# Patient Record
Sex: FEMALE | Race: WHITE | NOT HISPANIC OR LATINO | Employment: OTHER | URBAN - METROPOLITAN AREA
[De-identification: names, ages, dates, MRNs, and addresses within clinical notes are randomized per-mention and may not be internally consistent; named-entity substitution may affect disease eponyms.]

---

## 2023-11-09 ENCOUNTER — TELEPHONE (OUTPATIENT)
Dept: ORTHOPEDICS | Facility: CLINIC | Age: 78
End: 2023-11-09

## 2023-11-09 NOTE — TELEPHONE ENCOUNTER
Writer had called pt to get scheduled with soonest available appointment with any one of the spine providers. Referral was for Dr. Hagen, but his schedule is out to January. Pt should be seen sooner. Pt can schedule with Dr. Steen, Dr. Courtney, and or Dr. Martinez to be seen sooner.     Alta Palencia LPN

## 2023-11-15 ENCOUNTER — TELEPHONE (OUTPATIENT)
Dept: ORTHOPEDICS | Facility: CLINIC | Age: 78
End: 2023-11-15
Payer: COMMERCIAL

## 2023-11-15 DIAGNOSIS — S32.10XA: Primary | ICD-10-CM

## 2023-11-15 DIAGNOSIS — S32.2XXA: Primary | ICD-10-CM

## 2023-11-15 NOTE — TELEPHONE ENCOUNTER
Writer called and left a voice message for pt stating that Dr. Hagen would like pt to be seen sooner than January. Dr. Hagen does not have sooner available but Dr. Courtney and Dr. Martinez have opening 11/21 and 11/22 to be seen next week. Pt is to call clinic to discuss sooner.     Atla Palencia LPN

## 2023-11-15 NOTE — TELEPHONE ENCOUNTER
Action November 15, 2023 10:14 AM MT   Action Taken Sent a request for imaging from [facility].        DIAGNOSIS: Sacral Insufficiency Fx and L5 Pedicle Fx   APPOINTMENT DATE: 11/16/2023   NOTES STATUS DETAILS   OFFICE NOTE from referring provider Care Everywhere 11/06/2023 - Noe Aleman MD    OFFICE NOTE from other specialist Care Everywhere    PHYSICAL THERAPY Care Everywhere    MEDICATION LIST Care Everywhere    LABS     DEXA PACS  [facility]:  07/05/2022, 07/12/2019 - Hip/Spine   INJECTIONS DONE IN RADIOLOGY N/A  [facility]:  08/31/2023 - Injection   MRI PACS  [facility]:  10/23/2023 - L Spine   CT SCAN PACS  [facility]:  11/01/2023 - L Spine   XRAYS (IMAGES & REPORTS) PACS  [facility]:  11/06/2023- Pelvis    [facility]:  04/26/2023 - LT Hip/Pelvis

## 2023-11-16 ENCOUNTER — OFFICE VISIT (OUTPATIENT)
Dept: ORTHOPEDICS | Facility: CLINIC | Age: 78
End: 2023-11-16
Payer: COMMERCIAL

## 2023-11-16 ENCOUNTER — ANCILLARY PROCEDURE (OUTPATIENT)
Dept: GENERAL RADIOLOGY | Facility: CLINIC | Age: 78
End: 2023-11-16
Attending: ORTHOPAEDIC SURGERY
Payer: COMMERCIAL

## 2023-11-16 ENCOUNTER — PRE VISIT (OUTPATIENT)
Dept: ORTHOPEDICS | Facility: CLINIC | Age: 78
End: 2023-11-16

## 2023-11-16 ENCOUNTER — TELEPHONE (OUTPATIENT)
Dept: ORTHOPEDICS | Facility: CLINIC | Age: 78
End: 2023-11-16

## 2023-11-16 VITALS — WEIGHT: 136 LBS | BODY MASS INDEX: 25.68 KG/M2 | HEIGHT: 61 IN

## 2023-11-16 DIAGNOSIS — S32.2XXA: ICD-10-CM

## 2023-11-16 DIAGNOSIS — S32.10XA: ICD-10-CM

## 2023-11-16 DIAGNOSIS — M84.48XK: Primary | ICD-10-CM

## 2023-11-16 DIAGNOSIS — M80.00XG AGE-RELATED OSTEOPOROSIS WITH CURRENT PATHOLOGICAL FRACTURE WITH DELAYED HEALING, SUBSEQUENT ENCOUNTER: ICD-10-CM

## 2023-11-16 DIAGNOSIS — M41.55 OTHER SECONDARY SCOLIOSIS, THORACOLUMBAR REGION: ICD-10-CM

## 2023-11-16 PROCEDURE — 99205 OFFICE O/P NEW HI 60 MIN: CPT | Performed by: ORTHOPAEDIC SURGERY

## 2023-11-16 PROCEDURE — 77073 BONE LENGTH STUDIES: CPT | Performed by: STUDENT IN AN ORGANIZED HEALTH CARE EDUCATION/TRAINING PROGRAM

## 2023-11-16 PROCEDURE — 72170 X-RAY EXAM OF PELVIS: CPT | Performed by: RADIOLOGY

## 2023-11-16 PROCEDURE — 72082 X-RAY EXAM ENTIRE SPI 2/3 VW: CPT | Performed by: STUDENT IN AN ORGANIZED HEALTH CARE EDUCATION/TRAINING PROGRAM

## 2023-11-16 RX ORDER — MELOXICAM 7.5 MG/1
7.5 TABLET ORAL EVERY EVENING
COMMUNITY
Start: 2023-11-14 | End: 2023-12-06

## 2023-11-16 RX ORDER — ASCORBIC ACID 500 MG
500 TABLET ORAL EVERY MORNING
COMMUNITY

## 2023-11-16 RX ORDER — BUPROPION HYDROCHLORIDE 150 MG/1
150 TABLET ORAL EVERY MORNING
COMMUNITY
Start: 2023-08-09 | End: 2024-08-08

## 2023-11-16 RX ORDER — AMOXICILLIN 500 MG/1
CAPSULE ORAL
COMMUNITY
Start: 2023-01-24 | End: 2023-12-06

## 2023-11-16 RX ORDER — TRAMADOL HYDROCHLORIDE 50 MG/1
50 TABLET ORAL EVERY 8 HOURS PRN
COMMUNITY
Start: 2023-11-14

## 2023-11-16 RX ORDER — SENNOSIDES 8.6 MG
1300 CAPSULE ORAL EVERY 8 HOURS PRN
Status: ON HOLD | COMMUNITY
End: 2024-01-03

## 2023-11-16 RX ORDER — BUTYROSPERMUM PARKII(SHEA BUTTER), SIMMONDSIA CHINENSIS (JOJOBA) SEED OIL, ALOE BARBADENSIS LEAF EXTRACT .01; 1; 3.5 G/100G; G/100G; G/100G
1 LIQUID TOPICAL EVERY MORNING
COMMUNITY

## 2023-11-16 RX ORDER — THIAMINE HCL 100 MG
1 TABLET ORAL EVERY MORNING
COMMUNITY

## 2023-11-16 RX ORDER — LORAZEPAM 0.5 MG/1
0.5 TABLET ORAL PRN
COMMUNITY
Start: 2023-07-12 | End: 2023-11-22

## 2023-11-16 RX ORDER — PYRIDOXINE HCL (VITAMIN B6) 100 MG
500 TABLET ORAL EVERY MORNING
COMMUNITY

## 2023-11-16 NOTE — TELEPHONE ENCOUNTER
Patient is scheduled for surgery with Dr. Hagen    Spoke with: Patient and daughter-in-law    Date of Surgery: 12/5/23    Location: [facility]    Informed patient they will need an adult  : Yes    Post ops: 6 & 12 weeks    Pre op with Provider: Complete    H&P: Scheduled with PAC 11/22/23    Additional imaging/appointments: N/A    Surgery packet: Received in clinic     Additional comments: Please send PAC virtual link to daughter-in-law's email as she will be with and assisting the patient with setting up the call        Johanny Ernst MA on 11/16/2023 at 11:13 AM

## 2023-11-16 NOTE — LETTER
11/16/2023         RE: Garth Montgomery  [address]  [place] [address]        Dear Colleague,    Thank you for referring your patient, Garth Montgomery, to the [facility] [place]. Please see a copy of my visit note below.    REASON FOR CONSULTATION: Consult (Sacral Insuff FX & L5 Pedicle FX.  Other form of scoliosis of lumbar spine/[email or web address]/)       REFERRING PHYSICIAN: Noe Aleman     PRIMARY CARE PHYSICIAN: Lizzie Ruelas    HISTORY OF PRESENT ILLNESS: Garth Montgomery is a pleasant 78 year old female who is referred today for evaluation of her pelvis.  She was seen by Dr. Aleman who performed a CT scan and MRI identifying a sacral insufficiency fracture and right-sided pedicle fracture at L5.  Patient states that she had a ground-level fall in her bathtub back in August.  She states that all of her left-sided pain began at that time and she has had increasing pain since that time.  She has tried conservative measures since then such as Mobic tramadol and Tylenol which have provided intermittent relief but unfortunately she states her pain is continued to worsen.  She now uses a cane to ambulate because of the pain.  States it is uncomfortable to sit and find herself constantly repositioning due to the pain.  She states that she has no thoracic or lumbar back pain typically and was doing quite well before this incident.  She denies any numbness tingling or weakness down her bilateral lower extremities.    Patient overall is healthy denying any chronic medical conditions does not have diabetes is a non-smoker and has no reported history of cardiovascular incidents.    Oswestry score:   Oswestry (RENZO) Questionnaire        11/16/2023     9:12 AM   OSWESTRY DISABILITY INDEX   Count 9   Sum 27   Oswestry Score (%) 60 %       Visual Analog Scale (VAS) Questionnaire        11/16/2023     9:10 AM   VISUAL ANALOG PAIN SCALE   Back Pain Scale 0-10 8   Right leg pain 0  "  Left leg pain 8   Neck Pain Scale 0-10 0   Right arm pain 0   Left arm pain 0            REVIEW OF SYSTEMS:   See HPI for pertinent positives. Otherwise complete ROS negative.     No Known Allergies    No past medical history on file.    No past surgical history on file.    No family history on file.    Social History     Tobacco Use    Smoking status: Not on file    Smokeless tobacco: Not on file   Substance Use Topics    Alcohol use: Not on file       Current Outpatient Medications   Medication    acetaminophen (TYLENOL) 650 MG CR tablet    amoxicillin (AMOXIL) 500 MG capsule    buPROPion (WELLBUTRIN XL) 150 MG 24 hr tablet    calcium citrate-vitamin D (CALCIUM CITRATE-VITAMIN D3) 315-6.25 MG-MCG TABS per tablet    Cranberry (RA CRANBERRY) 500 MG CAPS    LORazepam (ATIVAN) 0.5 MG tablet    LUTEIN PO    Magnesium Citrate 100 MG CAPS    meloxicam (MOBIC) 7.5 MG tablet    potassium 99 MG TABS    traMADol (ULTRAM) 50 MG tablet    vitamin C (ASCORBIC ACID) 500 MG tablet     No current facility-administered medications for this visit.       PHYSICAL EXAM:  Ht 1.54 m (5' 0.63\")   Wt 61.7 kg (136 lb)   BMI 26.01 kg/m    Constitutional - Patient is healthy, well-nourished and appears stated age  Respiratory - Patient is breathing normally and in no respiratory distress.  Skin - No suspicious rashes or lesions.  Gait- raises from chair without assistance.  Uses a cane with ambulation, overall forward sagittal balance with standing   Neurologic - Sensation intact to light touch bilaterally.  patella +1, ankle + 1. ankle clonus 0 beats, plantar reflex downgoing.    Spine:   Thoracic Spine: normal kyphosis  Palpation - Non-tender to palpation  Lumbar Spine:  Appearance - Normal  Palpation - Non-tender to palpation apart from the approximate level of the right TP at L5.  Facets non-tender to palpation, facet loading negative  Straight leg raise negative  Patient is exquisitely tender to palpation over the left aspect of the " sacrum posteriorly.    Musculoskeletal:  Motor -    Motor -        LOWER EXTREMITY Left Right   Hip flexion 5/5 5/5   Knee flexion 5/5 5/5   Knee extension 5/5 5/5   Ankle dorsiflexion 5/5 5/5   Ankle plantarflexion 5/5 5/5   Great toe extension 5/5 5/5       IMAGING: all imaging is personally reviewed and interpreted during the visit.   Scoliosis EOS Spine XR, dated 11/16 demonstrating lumbar scoliosis as well as double major curve in the thoracic region.  Positive SVA with increased thoracic kyphosis.          Mcelroy view of the pelvis obtained today lucency left sacral ala likely consistent with fracture line visualized better on CT scan dated 11/1/2023.      CT scan dated 11/1/2023 reviewed demonstrating nondisplaced defect of the right pedicle at L5 visualized on the sagittal imaging below, additionally there is a left-sided zone 1 sacral insufficiency fracture on the axial imaging below.        MRI Lumbar Spine, dated 10/23/23      CLINICAL ASSESSMENT:   78 year old female with left-sided sacral insufficiency fracture and right-sided pedicle fracture at L5, nondisplaced.  Her pain is primarily posteriorly over the left side of the sacrum at this time.    DISCUSSION/PLAN:     Schedule for surgical fixation of left sacral insufficiency fracture  Will manage the right L5 pedicle fracture conservatively at this time  Patient is to touch base with her PCP about bone health and resuming treatment for her likely osteoporosis.     Patient has a left-sided sacral insufficiency fracture that is remaining significantly painful for her.  Again she additionally has a right L5 pedicle fracture.  Nondisplaced.  Patient has worsening pain and after discussion today and review of the imaging has agreed to undergo surgical fixation for the left-sided sacral insufficiency fracture.  She opted to undergo surgical fixation.      We reviewed the risks and benefits of the surgery in detail. The risks include those associated with  anesthesia, including death, pulmonary embolism, DVT, stroke, myocardial infarction, pneumonia, blindness, and urinary tract infection. Additional risks include the risk of blood loss, infection, nerve damage, failure to heal, hardware problems and failure of the intervention to improve their symptoms.  With regard to blood loss, we use a medication called tranexamic acid.  To mitigate the risk of infection, we use antibiotics, both IV and in the wound.  The risk of nerve damage includes temporary or permanent damage resulting in weakness, pain, numbness, and even paralysis. With regard to nerve damage, we use intra-operative neuro-monitoring to help identify and address potential problems.  To assist in healing the fusion, we use bone graft .  To avoid hardware problems, we use an intra-operative CT, which is like GPS for the spine.  They understands the risks of the surgery and wishes to proceed.     All questions and concerns were answered to the patient's apparent satisfaction before leaving the clinic. We used the patient's imaging, diagrams, models to explain the pathophysiology of their disease as well as surgical and non-surgical treatment options.     Thank you for allowing us to be a part of this patient's care.   The above plan was formulated by Dr. Hagen who also saw and examined the patient.     Respectfully,  Nathaniel Shannon, DO  Spine Fellow       Greater than 45 minutes was dedicated to review of related documentation, prior imaging studies and patient exam and encounter     I saw and evaluated the patient today.  I independently reviewed and interpreted her outside imaging studies as well as the eos and Mcelroy views of the pelvis today.  I confirmed the diagnosis of the sacral insufficiency fracture in the contralateral pedicle fracture.  Her scoliosis does not appear as severe as previously seen.  After extended discussion with the patient and her  the plan is to treat her sacral  insufficiency fracture.  We discussed the treatment options risk benefits alternative treatments and expected outcomes.  I explained that I am a consultant for the company that makes the screws that we will use to fix the sacral insufficiency fracture.  I explained that this is typically outpatient surgery.  I explained the critical nature of treating her osteoporosis.  We specifically discussed anabolic bone agents.  My recommendation to her would be the use of Evenity.  This has the most significant effect and the most rapid affect.  If at all possible we would like for her to get 1 dose of this prior to getting her sacral fracture fixed.  If we cannot arrange that then we will go forward with the sacral fracture fixation and then try to get that set up afterwards.  Subsequent to the visit I was able to speak to one of my colleagues Dr. Brittney Mccollum who does bone health for us.  I went over the patient's case scenario and showed her the sacral insufficiency fracture.  I also showed her the opportunistic bone mineral density being in the range of 50 to 60 Hounsfield units at L1 with normal being 135.  Dr. Mccollum indicated that typically a DEXA scan is still required even with an opportunistic bone mineral density.  She will try to set this up to get it done closer to the patient's home.  She will then try to get her started on the Evenity.    My total contact time for this patient encounter including image ordering, image interpretation, face-to-face time, documentation, case  request, professional colleague consultation was greater than 60 minutes.  Jones Hagne MD          Teaching Flowsheet   Relevant Diagnosis: spine  Teaching Topic: preop     Person(s) involved in teaching:   Patient and Dtr in law     Motivation Level:  Asks Questions: Yes  Eager to Learn: Yes  Cooperative: Yes  Receptive (willing/able to accept information): Yes  Any cultural factors/Latter-day beliefs that may influence  understanding or compliance? No       Patient and Family demonstrates understanding of the following:  Reason for the appointment, diagnosis and treatment plan: Yes  Knowledge of proper use of medications and conditions for which they are ordered (with special attention to potential side effects or drug interactions): Yes  Which situations necessitate calling provider and whom to contact: Yes       Teaching Concerns Addressed:        Proper use and care of meds (medical equip, care aids, etc.): Yes  Nutritional needs and diet plan: Yes  Pain management techniques: Yes  Wound Care: Yes  How and/when to access community resources: Yes     Instructional Materials Used/Given: preop pkt     Time spent with patient: 15 minutes.      Jones Hagen MD

## 2023-11-16 NOTE — NURSING NOTE
"Reason For Visit:   Chief Complaint   Patient presents with    Consult     Sacral Insuff FX & L5 Pedicle FX.  Other form of scoliosis of lumbar spine/[email or web address]       Primary MD: Lizzie Ruelas  Ref. MD: Dr. Aleman    ?  No  Occupation retired.    Date of injury: 8/2023  Type of injury: Fell in bath tub, spring fell in driveway    Date of surgery: No  Type of surgery: No.    Smoker: No  Request smoking cessation information: No    Ht 1.54 m (5' 0.63\")   Wt 61.7 kg (136 lb)   BMI 26.01 kg/m      Pain Assessment  Patient Currently in Pain: Yes  0-10 Pain Scale: 8  Primary Pain Location: Back    Oswestry (RENZO) Questionnaire        11/16/2023     9:12 AM   OSWESTRY DISABILITY INDEX   Count 9   Sum 27   Oswestry Score (%) 60 %        Visual Analog Pain Scale  Back Pain Scale 0-10: 8  Right leg pain: 0  Left leg pain: 8  Neck Pain Scale 0-10: 0  Right arm pain: 0  Left arm pain: 0    Promis 10 Assessment        11/16/2023     9:14 AM   PROMIS 10   In general, would you say your health is: Very good   In general, would you say your quality of life is: Good   In general, how would you rate your physical health? Good   In general, how would you rate your mental health, including your mood and your ability to think? Good   In general, how would you rate your satisfaction with your social activities and relationships? Very good   In general, please rate how well you carry out your usual social activities and roles Very good   To what extent are you able to carry out your everyday physical activities such as walking, climbing stairs, carrying groceries, or moving a chair? Mostly   In the past 7 days, how often have you been bothered by emotional problems such as feeling anxious, depressed, or irritable? Rarely   In the past 7 days, how would you rate your fatigue on average? Moderate   In the past 7 days, how would you rate your pain on average, where 0 means no pain, and 10 means worst " imaginable pain? 8   In general, would you say your health is: 4   In general, would you say your quality of life is: 3   In general, how would you rate your physical health? 3   In general, how would you rate your mental health, including your mood and your ability to think? 3   In general, how would you rate your satisfaction with your social activities and relationships? 4   In general, please rate how well you carry out your usual social activities and roles. (This includes activities at home, at work and in your community, and responsibilities as a parent, child, spouse, employee, friend, etc.) 4   To what extent are you able to carry out your everyday physical activities such as walking, climbing stairs, carrying groceries, or moving a chair? 4   In the past 7 days, how often have you been bothered by emotional problems such as feeling anxious, depressed, or irritable? 2   In the past 7 days, how would you rate your fatigue on average? 3   In the past 7 days, how would you rate your pain on average, where 0 means no pain, and 10 means worst imaginable pain? 8   Global Mental Health Score 14   Global Physical Health Score 12   PROMIS TOTAL - SUBSCORES 26                Alta Palencia LPN

## 2023-11-16 NOTE — PROGRESS NOTES
REASON FOR CONSULTATION: Consult (Sacral Insuff FX & L5 Pedicle FX.  Other form of scoliosis of lumbar spine/[email or web address]       REFERRING PHYSICIAN: Noe Aleman     PRIMARY CARE PHYSICIAN: Lizzie Ruelas    HISTORY OF PRESENT ILLNESS: Garth Montgomery is a pleasant 78 year old female who is referred today for evaluation of her pelvis.  She was seen by Dr. Aleman who performed a CT scan and MRI identifying a sacral insufficiency fracture and right-sided pedicle fracture at L5.  Patient states that she had a ground-level fall in her bathtub back in August.  She states that all of her left-sided pain began at that time and she has had increasing pain since that time.  She has tried conservative measures since then such as Mobic tramadol and Tylenol which have provided intermittent relief but unfortunately she states her pain is continued to worsen.  She now uses a cane to ambulate because of the pain.  States it is uncomfortable to sit and find herself constantly repositioning due to the pain.  She states that she has no thoracic or lumbar back pain typically and was doing quite well before this incident.  She denies any numbness tingling or weakness down her bilateral lower extremities.    Patient overall is healthy denying any chronic medical conditions does not have diabetes is a non-smoker and has no reported history of cardiovascular incidents.    Oswestry score:   Oswestry (RENZO) Questionnaire        11/16/2023     9:12 AM   OSWESTRY DISABILITY INDEX   Count 9   Sum 27   Oswestry Score (%) 60 %       Visual Analog Scale (VAS) Questionnaire        11/16/2023     9:10 AM   VISUAL ANALOG PAIN SCALE   Back Pain Scale 0-10 8   Right leg pain 0   Left leg pain 8   Neck Pain Scale 0-10 0   Right arm pain 0   Left arm pain 0            REVIEW OF SYSTEMS:   See HPI for pertinent positives. Otherwise complete ROS negative.     No Known Allergies    No past medical history on file.    No past  "surgical history on file.    No family history on file.    Social History     Tobacco Use    Smoking status: Not on file    Smokeless tobacco: Not on file   Substance Use Topics    Alcohol use: Not on file       Current Outpatient Medications   Medication    acetaminophen (TYLENOL) 650 MG CR tablet    amoxicillin (AMOXIL) 500 MG capsule    buPROPion (WELLBUTRIN XL) 150 MG 24 hr tablet    calcium citrate-vitamin D (CALCIUM CITRATE-VITAMIN D3) 315-6.25 MG-MCG TABS per tablet    Cranberry (RA CRANBERRY) 500 MG CAPS    LORazepam (ATIVAN) 0.5 MG tablet    LUTEIN PO    Magnesium Citrate 100 MG CAPS    meloxicam (MOBIC) 7.5 MG tablet    potassium 99 MG TABS    traMADol (ULTRAM) 50 MG tablet    vitamin C (ASCORBIC ACID) 500 MG tablet     No current facility-administered medications for this visit.       PHYSICAL EXAM:  Ht 1.54 m (5' 0.63\")   Wt 61.7 kg (136 lb)   BMI 26.01 kg/m    Constitutional - Patient is healthy, well-nourished and appears stated age  Respiratory - Patient is breathing normally and in no respiratory distress.  Skin - No suspicious rashes or lesions.  Gait- raises from chair without assistance.  Uses a cane with ambulation, overall forward sagittal balance with standing   Neurologic - Sensation intact to light touch bilaterally.  patella +1, ankle + 1. ankle clonus 0 beats, plantar reflex downgoing.    Spine:   Thoracic Spine: normal kyphosis  Palpation - Non-tender to palpation  Lumbar Spine:  Appearance - Normal  Palpation - Non-tender to palpation apart from the approximate level of the right TP at L5.  Facets non-tender to palpation, facet loading negative  Straight leg raise negative  Patient is exquisitely tender to palpation over the left aspect of the sacrum posteriorly.    Musculoskeletal:  Motor -    Motor -        LOWER EXTREMITY Left Right   Hip flexion 5/5 5/5   Knee flexion 5/5 5/5   Knee extension 5/5 5/5   Ankle dorsiflexion 5/5 5/5   Ankle plantarflexion 5/5 5/5   Great toe extension " 5/5 5/5       IMAGING: all imaging is personally reviewed and interpreted during the visit.   Scoliosis EOS Spine XR, dated 11/16 demonstrating lumbar scoliosis as well as double major curve in the thoracic region.  Positive SVA with increased thoracic kyphosis.          Mcelroy view of the pelvis obtained today lucency left sacral ala likely consistent with fracture line visualized better on CT scan dated 11/1/2023.      CT scan dated 11/1/2023 reviewed demonstrating nondisplaced defect of the right pedicle at L5 visualized on the sagittal imaging below, additionally there is a left-sided zone 1 sacral insufficiency fracture on the axial imaging below.        MRI Lumbar Spine, dated 10/23/23      CLINICAL ASSESSMENT:   78 year old female with left-sided sacral insufficiency fracture and right-sided pedicle fracture at L5, nondisplaced.  Her pain is primarily posteriorly over the left side of the sacrum at this time.    DISCUSSION/PLAN:     Schedule for surgical fixation of left sacral insufficiency fracture  Will manage the right L5 pedicle fracture conservatively at this time  Patient is to touch base with her PCP about bone health and resuming treatment for her likely osteoporosis.     Patient has a left-sided sacral insufficiency fracture that is remaining significantly painful for her.  Again she additionally has a right L5 pedicle fracture.  Nondisplaced.  Patient has worsening pain and after discussion today and review of the imaging has agreed to undergo surgical fixation for the left-sided sacral insufficiency fracture.  She opted to undergo surgical fixation.      We reviewed the risks and benefits of the surgery in detail. The risks include those associated with anesthesia, including death, pulmonary embolism, DVT, stroke, myocardial infarction, pneumonia, blindness, and urinary tract infection. Additional risks include the risk of blood loss, infection, nerve damage, failure to heal, hardware problems  and failure of the intervention to improve their symptoms.  With regard to blood loss, we use a medication called tranexamic acid.  To mitigate the risk of infection, we use antibiotics, both IV and in the wound.  The risk of nerve damage includes temporary or permanent damage resulting in weakness, pain, numbness, and even paralysis. With regard to nerve damage, we use intra-operative neuro-monitoring to help identify and address potential problems.  To assist in healing the fusion, we use bone graft .  To avoid hardware problems, we use an intra-operative CT, which is like GPS for the spine.  They understands the risks of the surgery and wishes to proceed.     All questions and concerns were answered to the patient's apparent satisfaction before leaving the clinic. We used the patient's imaging, diagrams, models to explain the pathophysiology of their disease as well as surgical and non-surgical treatment options.     Thank you for allowing us to be a part of this patient's care.   The above plan was formulated by Dr. Hagen who also saw and examined the patient.     Respectfully,  Nathaniel Shannon, DO  Spine Fellow       Greater than 45 minutes was dedicated to review of related documentation, prior imaging studies and patient exam and encounter     I saw and evaluated the patient today.  I independently reviewed and interpreted her outside imaging studies as well as the eos and Mcelroy views of the pelvis today.  I confirmed the diagnosis of the sacral insufficiency fracture in the contralateral pedicle fracture.  Her scoliosis does not appear as severe as previously seen.  After extended discussion with the patient and her  the plan is to treat her sacral insufficiency fracture.  We discussed the treatment options risk benefits alternative treatments and expected outcomes.  I explained that I am a consultant for the company that makes the screws that we will use to fix the sacral insufficiency fracture.   I explained that this is typically outpatient surgery.  I explained the critical nature of treating her osteoporosis.  We specifically discussed anabolic bone agents.  My recommendation to her would be the use of Evenity.  This has the most significant effect and the most rapid affect.  If at all possible we would like for her to get 1 dose of this prior to getting her sacral fracture fixed.  If we cannot arrange that then we will go forward with the sacral fracture fixation and then try to get that set up afterwards.  Subsequent to the visit I was able to speak to one of my colleagues Dr. Brittney Mccollum who does bone health for us.  I went over the patient's case scenario and showed her the sacral insufficiency fracture.  I also showed her the opportunistic bone mineral density being in the range of 50 to 60 Hounsfield units at L1 with normal being 135.  Dr. Mccollum indicated that typically a DEXA scan is still required even with an opportunistic bone mineral density.  She will try to set this up to get it done closer to the patient's home.  She will then try to get her started on the Evenity.    My total contact time for this patient encounter including image ordering, image interpretation, face-to-face time, documentation, case  request, professional colleague consultation was greater than 60 minutes.  Jonse Hagen MD

## 2023-11-17 NOTE — TELEPHONE ENCOUNTER
FUTURE VISIT INFORMATION      SURGERY INFORMATION:  Date: 12/5/23  Location: ur or  Surgeon:  Jones Hagen MD   Anesthesia Type:  general  Procedure: Minimally invasive Left  Sacral Iliac joint fusion   Consult: ov 11/16/23    RECORDS REQUESTED FROM:       Primary Care Provider: Lizzie Ruelas APRN,CNP  - [facility]    Most recent EKG+ Tracing: 10/12/22- [facility]

## 2023-11-17 NOTE — PROGRESS NOTES
Teaching Flowsheet   Relevant Diagnosis: spine  Teaching Topic: preop     Person(s) involved in teaching:   Patient and Dtr in law     Motivation Level:  Asks Questions: Yes  Eager to Learn: Yes  Cooperative: Yes  Receptive (willing/able to accept information): Yes  Any cultural factors/Protestant beliefs that may influence understanding or compliance? No       Patient and Family demonstrates understanding of the following:  Reason for the appointment, diagnosis and treatment plan: Yes  Knowledge of proper use of medications and conditions for which they are ordered (with special attention to potential side effects or drug interactions): Yes  Which situations necessitate calling provider and whom to contact: Yes       Teaching Concerns Addressed:        Proper use and care of meds (medical equip, care aids, etc.): Yes  Nutritional needs and diet plan: Yes  Pain management techniques: Yes  Wound Care: Yes  How and/when to access community resources: Yes     Instructional Materials Used/Given: preop pkt     Time spent with patient: 15 minutes.

## 2023-11-22 ENCOUNTER — VIRTUAL VISIT (OUTPATIENT)
Dept: SURGERY | Facility: CLINIC | Age: 78
End: 2023-11-22
Payer: COMMERCIAL

## 2023-11-22 ENCOUNTER — ANESTHESIA EVENT (OUTPATIENT)
Dept: SURGERY | Facility: CLINIC | Age: 78
End: 2023-11-22
Payer: COMMERCIAL

## 2023-11-22 ENCOUNTER — PRE VISIT (OUTPATIENT)
Dept: SURGERY | Facility: CLINIC | Age: 78
End: 2023-11-22

## 2023-11-22 DIAGNOSIS — Z01.818 PRE-OP EVALUATION: Primary | ICD-10-CM

## 2023-11-22 PROCEDURE — 99202 OFFICE O/P NEW SF 15 MIN: CPT | Mod: 95 | Performed by: PHYSICIAN ASSISTANT

## 2023-11-22 RX ORDER — MULTIPLE VITAMINS W/ MINERALS TAB 9MG-400MCG
1 TAB ORAL EVERY MORNING
COMMUNITY

## 2023-11-22 RX ORDER — SODIUM PHOSPHATE,MONO-DIBASIC 19G-7G/118
1 ENEMA (ML) RECTAL EVERY MORNING
COMMUNITY

## 2023-11-22 RX ORDER — THIAMINE HCL 50 MG
100 TABLET ORAL EVERY MORNING
COMMUNITY

## 2023-11-22 ASSESSMENT — LIFESTYLE VARIABLES: TOBACCO_USE: 0

## 2023-11-22 ASSESSMENT — ENCOUNTER SYMPTOMS: SEIZURES: 0

## 2023-11-22 NOTE — PATIENT INSTRUCTIONS
Preparing for Your Surgery      Name:  Garth Montgomery   MRN:  [id number]   :  1945   Today's Date:  2023       Arriving for surgery:  Surgery date:  23  Arrival time:  6:30AM    Please come to:     Please come to:      [facility] acute [facility] [address]  [address] [place]  [address]  The Green Ramp for patients and visitors is located beneath the [facility]. The parking facility entrance is at the intersection of [address] [place] [address] and [address]. Patients and visitors who self-park will receive the reduced hospital parking rate (no ticket validation needed).  Smart Panel parking, located at the [facility] main entrance on [address] [place] [address], is available Monday - Friday from 7 am to 3:30 pm.  Discounted parking pass options can be purchased from  attendants during business hours.  -Check in at the security desk in the [facility] ([facility]) Lobby. They will direct you to the correct elevators.  -Proceed to the 3rd floor, check in at the Adult Surgery Waiting Lounge. [phone]  If you are in need of directions, a wheelchair or escort please stop at the Information Desk in the lobby.  Inform the information person that you are here for surgery; a wheelchair and escort to [address] will be provided.   An escort to the Adult Surgery Waiting Lounge will be provided.    What can I eat or drink?  -  You may eat and drink normally up to 8 hours prior to arrival time. (Until 23, 10:30PM)  -  You may have clear liquids until 2 hours prior to arrival time. (Until 23, 4:30AM)    Examples of clear liquids:  Water  Clear broth  Juices (apple, white grape, white cranberry  and cider) without pulp  Noncarbonated, powder based beverages  (lemonade and Krystian-Aid)  Sodas (Sprite, 7-Up, ginger ale and seltzer)  Coffee or tea (without milk or cream)  Gatorade    -  No Alcohol  or cannabis products for at least 24 hours before surgery.     Which medicines can I take?    Hold Meloxicam(Mobic) for 10 days prior to surgery, last dose 11/24/23.   Hold Aspirin for 7 days before surgery.   Hold Multivitamins for 7 days before surgery.  Hold Supplements, Cranberry, Glucosamine-Chondroitin, Vitamin B1 and Vitamin C for 7 days before surgery.  Hold Ibuprofen (Advil, Motrin) for 1 day(s) before surgery--unless otherwise directed by surgeon.  Hold Naproxen (Aleve) for 4 days before surgery.    -  DO NOT take these medications the day of surgery:    Calcium-Vitamin D    Vitamin D    Magnesium Citrate    Potassium        -  PLEASE TAKE these medications the day of surgery:    Acetaminophen(Tylenol) as needed    Bupropion(Wellbutrin)    Tramadol(Ultram) as needed    How do I prepare myself?  - Please take 2 showers (one the night prior to surgery and one the morning of surgery) using Scrubcare or Hibiclens soap.    Use this soap only from the neck to your toes.     Leave the soap on your skin for one minute--then rinse thoroughly.      You may use your own shampoo and conditioner. No other hair products.   - Please remove all jewelry and body piercings.  - No lotions, deodorants or fragrance.  - No makeup or fingernail polish.   - Bring your ID and insurance card.    -If you use a CPAP machine, please bring the CPAP machine, tubing, and mask to hospital.    -If you have a Deep Brain Stimulator, Spinal Cord Stimulator, or any Neuro Stimulator device---you must bring the remote control to the hospital.      ALL PATIENTS GOING HOME THE SAME DAY OF SURGERY ARE REQUIRED TO HAVE A RESPONSIBLE ADULT TO DRIVE AND BE IN ATTENDANCE WITH THEM FOR 24 HOURS FOLLOWING SURGERY.    Covid testing policy as of 12/06/2022  Your surgeon will notify and schedule you for a COVID test if one is needed before surgery--please direct any questions or COVID symptoms to your surgeon      Questions or Concerns:    - For any  questions regarding the day of surgery or your hospital stay, please contact the Pre Admission Nursing Office at [phone].       - If you have health changes between today and your surgery, please call your surgeon.       - For questions after surgery, please call your surgeons office.           Current Visitor Guidelines    You may have 2 visitors in the pre op area.    Visiting hours: 8 a.m. to 8:30 p.m.    You may have four visitors during your inpatient hospital stay.    Patients confirmed or suspected to have symptoms of COVID 19 or flu:     No visitors allowed for adult patients.   Children (under age 18) can have 1 named visitor.     People who are sick or showing symptoms of COVID 19 or flu:    Are not allowed to visit patients--we can only make exceptions in special situations.       Please follow these guidelines for your visit:          Please maintain social distance          Masking is optional--however at times you may be asked to wear a mask for the safety of yourself and others     Clean your hands with alcohol hand . Do this when you arrive at and leave the building and patient room,    And again after you touch your mask or anything in the room.     Go directly to and from the room you are visiting.     Stay in the patient s room during your visit. Limit going to other places in the hospital as much as possible     Leave bags and jackets at home or in the car.     For everyone s health, please don t come and go during your visit. That includes for smoking   during your visit.

## 2023-11-22 NOTE — PROGRESS NOTES
Garth is a 78 year old who is being evaluated via a billable video visit.      How would you like to obtain your AVS?   [email or web address] [email or web address]    Subjective   Garth is a 78 year old, presenting for the following health issues:  Pre-Op Exam (/)    GABRIEL Lyon LPN

## 2023-11-22 NOTE — H&P
Pre-Operative H & P     CC:  Preoperative exam to assess for increased cardiopulmonary risk while undergoing surgery and anesthesia.    Date of Encounter: 11/22/2023  Primary Care Physician:  Lizzie Ruelas     Reason for visit:   Encounter Diagnosis   Name Primary?    Pre-op evaluation Yes       HPI  Garth Montgomery is a 78 year old female who presents for pre-operative H & P in preparation for  Procedure Information       Case: [id number] Date/Time: 12/05/23 0830    Procedure: Minimally invasive Left  Sacral Iliac joint fusion (Left: Sacrum)    Anesthesia type: General    Diagnosis: Sacral insufficiency fracture, with nonunion, subsequent encounter [M84.48XK]    Pre-op diagnosis: Sacral insufficiency fracture, with nonunion, subsequent encounter [M84.48XK]    Location:  OR 02 / [facility] OR    Providers: Jones Hagen MD            Patient is being evaluated for comorbid conditions of dyslipidemia, depression, anxiety    Ms. Flores has a history of a sacral insufficiency fracture and right sided pedicle fracture. She was seen by Dr. Hagen for further evaluation and is now scheduled for the above procedure.     History is obtained from the patient and chart review    Hx of abnormal bleeding or anti-platelet use: denies    Menstrual history: No LMP recorded. Patient has had a hysterectomy.     Past Medical History  No past medical history on file.    Past Surgical History  Past Surgical History:   Procedure Laterality Date    HYSTERECTOMY      ORTHOPEDIC SURGERY      foot surgery    TKA         Prior to Admission Medications  Current Outpatient Medications   Medication Sig Dispense Refill    acetaminophen (TYLENOL) 650 MG CR tablet Take 1,300 mg by mouth      buPROPion (WELLBUTRIN XL) 150 MG 24 hr tablet Take 150 mg by mouth every morning      calcium citrate-vitamin D (CALCIUM CITRATE-VITAMIN D3) 315-6.25 MG-MCG TABS per tablet Take 1 tablet by mouth every morning      Cranberry (RA CRANBERRY) 500 MG CAPS Take 500  mg by mouth every morning      glucosamine-chondroitin 500-400 MG CAPS per capsule Take 1 capsule by mouth every morning      Magnesium Citrate 100 MG CAPS Take 1 capsule by mouth every morning      meloxicam (MOBIC) 7.5 MG tablet Take 7.5 mg by mouth every evening      multivitamin w/minerals (MULTI-VITAMIN) tablet Take 1 tablet by mouth every morning      potassium 99 MG TABS Take 1 tablet by mouth every morning      traMADol (ULTRAM) 50 MG tablet Take 50 mg by mouth every 6 hours as needed      vitamin B1 (THIAMINE) 50 MG tablet Take 100 mg by mouth every morning      vitamin C (ASCORBIC ACID) 500 MG tablet Take 500 mg by mouth every morning      Vitamin D, Cholecalciferol, 10 MCG (400 UNIT) CHEW Take by mouth every morning      amoxicillin (AMOXIL) 500 MG capsule Take 4 capsules by mouth one hour prior to Dental Appointment.         Allergies  No Known Allergies    Social History  Social History     Socioeconomic History    Marital status:      Spouse name: Not on file    Number of children: Not on file    Years of education: Not on file    Highest education level: Not on file   Occupational History    Not on file   Tobacco Use    Smoking status: Never     Passive exposure: Never    Smokeless tobacco: Never   Substance and Sexual Activity    Alcohol use: Not Currently    Drug use: Never    Sexual activity: Not on file   Other Topics Concern    Not on file   Social History Narrative    Not on file     Social Determinants of Health     Financial Resource Strain: Not on file   Food Insecurity: Not on file   Transportation Needs: Not on file   Physical Activity: Not on file   Stress: Not on file   Social Connections: Not on file   Interpersonal Safety: Not on file   Housing Stability: Not on file       Family History  Family History   Problem Relation Age of Onset    Anesthesia Reaction No family hx of     Deep Vein Thrombosis (DVT) No family hx of        Review of Systems  The complete review of systems is  negative other than noted in the HPI or here.   Anesthesia Evaluation   Pt has had prior anesthetic.     No history of anesthetic complications       ROS/MED HX  ENT/Pulmonary:  - neg pulmonary ROS  (-) tobacco use   Neurologic:  - neg neurologic ROS  (-) no seizures and no CVA   Cardiovascular:     (+)  - -   -  - -                                 Previous cardiac testing   Echo: Date: Results:    Stress Test:  Date: Results:    ECG Reviewed:  Date: 10/2022 Results:  NSR  Cath:  Date: Results:   (-) taking anticoagulants/antiplatelets   METS/Exercise Tolerance: >4 METS Comment: Exercise classes twice weekly- walking and leg/arm exercises. Can walk multiple blocks and ascend stairs without exertional symptoms    Hematologic:  - neg hematologic  ROS  (-) history of blood clots and history of blood transfusion   Musculoskeletal: Comment: Sacral insufficiency        GI/Hepatic:  - neg GI/hepatic ROS  (-) GERD   Renal/Genitourinary:  - neg Renal ROS     Endo:  - neg endo ROS  (-) chronic steroid usage   Psychiatric/Substance Use:     (+) psychiatric history anxiety and depression       Infectious Disease:  - neg infectious disease ROS     Malignancy:  - neg malignancy ROS     Other:            Virtual visit -  No vitals were obtained    Physical Exam  Constitutional: Awake, alert, cooperative, no apparent distress, and appears stated age.  HENT: Normocephalic  Respiratory: non labored breathing   Neurologic: Awake, alert, oriented to name, place and time.   Neuropsychiatric: Calm, cooperative. Normal affect.      Prior Labs/Diagnostic Studies   All labs and imaging personally reviewed     EKG/ stress test - if available please see in ROS above   No results found.       No data to display                  The patient's records and results personally reviewed by this provider.     Outside records reviewed from: Care Everywhere      Assessment    GarthPat Montgomery is a 78 year old female seen as a PAC referral for risk  "assessment and optimization for anesthesia.    Plan/Recommendations  Pt will be optimized for the proposed procedure.  See below for details on the assessment, risk, and preoperative recommendations    NEUROLOGY  - No history of TIA, CVA or seizure  -Post Op delirium risk factors:  No risk identified    ENT  - No current airway concerns.  Will need to be reassessed day of surgery.  Mallampati: Unable to assess  TM: Unable to assess    CARDIAC  - No history of CAD, Hypertension, and Afib  -denies cardiac history or symptoms   - METS (Metabolic Equivalents)  Patient performs 4 or more METS exercise without symptoms            Total Score: 0      RCRI-Very low risk: Class 1 0.4% complication rate            Total Score: 0        PULMONARY  MAGGI Low Risk            Total Score: 1    MAGGI: Over 50 ys old      - Denies asthma or inhaler use  - Tobacco History    History   Smoking Status    Never   Smokeless Tobacco    Never       GI  PONV High Risk  Total Score: 3           1 AN PONV: Pt is Female    1 AN PONV: Patient is not a current smoker    1 AN PONV: Intended Post Op Opioids        /RENAL  - Baseline Creatinine WNL, will update prior to surgery. Orders being faxed to [facility]     ENDOCRINE    - BMI: Estimated body mass index is 26.01 kg/m  as calculated from the following:    Height as of 11/16/23: 1.54 m (5' 0.63\").    Weight as of 11/16/23: 61.7 kg (136 lb).  Overweight (BMI 25.0-29.9)  - No history of Diabetes Mellitus    HEME  VTE Low Risk 0.26%            Total Score: 1    VTE: Greater than 59 yrs old      - No history of abnormal bleeding or antiplatelet use.  -hgb WNL, will update prior to surgery . Orders being faxed to [facility]     MSK  -sacral insufficiency fracture with the above procedure planned     Different anesthesia methods/types have been discussed with the patient, but they are aware that the final plan will be decided by the assigned anesthesia provider on the date of " service.    The patient is optimized for their procedure. AVS with information on surgery time/arrival time, meds and NPO status given by nursing staff. No further diagnostic testing indicated.    Please refer to the physical examination documented by the anesthesiologist in the anesthesia record on the day of surgery.    Video-Visit Details    Type of service:  Video Visit    Provider received verbal consent for a Video Visit from the patient? Yes     Originating Location (pt. Location): Home    Distant Location (provider location):  Off-site  Mode of Communication:  Video Conference via [facility]  On the day of service:     Prep time: 12 minutes  Visit time: 11 minutes  Documentation time: 5 minutes  ------------------------------------------  Total time: 28 minutes      Dawna Paz PA-C  Preoperative Assessment Center  [facility]  Clinic and Surgery Center  Phone: [phone]  Fax: [phone]

## 2023-11-27 ENCOUNTER — TELEPHONE (OUTPATIENT)
Dept: ORTHOPEDICS | Facility: CLINIC | Age: 78
End: 2023-11-27
Payer: COMMERCIAL

## 2023-11-27 NOTE — TELEPHONE ENCOUNTER
Health Call Center    Phone Message    May a detailed message be left on voicemail: yes     Reason for Call: Other: Patient's daughter is calling in to obtain information on patient's post op care including restrictions and when patient starts PT. She would also like information on patient's bone health visit.       Action Taken: Other: [id number]    Travel Screening: Not Applicable

## 2023-11-28 ENCOUNTER — TELEPHONE (OUTPATIENT)
Dept: ORTHOPEDICS | Facility: CLINIC | Age: 78
End: 2023-11-28
Payer: COMMERCIAL

## 2023-11-28 DIAGNOSIS — M84.48XK: Primary | ICD-10-CM

## 2023-11-28 DIAGNOSIS — M80.00XG AGE-RELATED OSTEOPOROSIS WITH CURRENT PATHOLOGICAL FRACTURE WITH DELAYED HEALING, SUBSEQUENT ENCOUNTER: ICD-10-CM

## 2023-11-28 NOTE — TELEPHONE ENCOUNTER
Hello,    I'm with ortho con.  Pt of Dr. Hagen.  Mallory with [employer] is calling from , [facility].  She works with [employer].  She is saying pt is having a preop tomorrow and asking if special labs are needed?  Also, pt had a video preop a week ago, is this appt still needed for tomorrow?  Fax# [phone].  Thank you.

## 2023-11-28 NOTE — TELEPHONE ENCOUNTER
See phone message from Primary clinic on 11-28-23 & see phone message from daughter in law yesterday 11-27-23.    I tried all day yesterday to get a hold of daughter & patient & left VM that I will keep trying to call them back.    Today 11-28-23 I called back & spoke to Mallory at [facility] Primary clinic & told her that pt does not need a preop H&P done there because pt had it done 11-22-23 by our PAC clinic. It was Video appt so PAC clinic ordered Labs K, Hgb & Cr & faxed order to [facility] so pt needs Lab appt.  Confirmed Mallory does have the Lab orders from PAC & will make Lab appt.& she will try to get a hold of pt.    I asked her to be sure the Lab faxes results to PAC clinic fax# [phone] & call them [phone] to be sure received so PAC staff can review.    I was able to get a hold of pt. 11-28-23 & review all above & have pt write down all above.  I told pt that  I will continue to try to get a hold  of daughter in law to answer all preop questions & order postop PT & review above that pt needs Labs done.  Call back prn.   S.O./Sherie Lomas RN.

## 2023-11-28 NOTE — TELEPHONE ENCOUNTER
I was able to get a hold of daughter in law this afternoon late 11-28-23 & reviewed all above & she understood.    I also told her  that Insurance denied surgery because they do not understand it is a pelvic fixation for Sacrl Insuff Fracture not an SI Joint Fusion & that  is trying to set up a peer review with insurance.  She will let pt know.   We will try to let daughter in law know if it gets approved or is denied & will have to be RSCd.    Call back prn. She agreed.    S.O./Sherie Lomas RN.

## 2023-11-28 NOTE — TELEPHONE ENCOUNTER
See phone message from Primary clinic on 11-28-23 & see phone message from daughter in law yesterday 11-27-23.    I tried all day yesterday to get a hold of daughter & patient & left VM that I will keep trying to call them back.    Today 11-28-23 I called back & spoke to Mallory at [facility] Primary clinic & told her that pt does not need a preop H&P done there because pt had it done 11-22-23 by our PAC clinic. It was Video appt so PAC clinic ordered Labs K, Hgb & Cr & faxed order to [facility] so pt needs Lab appt.  Confirmed Mallory does have the Lab orders from PAC & will make Lab appt.& she will try to get a hold of pt.    I asked her to be sure the Lab faxes results to PAC clinic fax# [phone] & call them [phone] to be sure received so PAC staff can review.    I was able to get a hold of pt. 11-28-23 & review all above & have pt write down all above.  I told pt that  I will continue to try to get a hold  of daughter in law to answer all preop questions & order postop PT & review above that pt needs Labs done.      I was able to get a hold of daughter in law this afternoon late 11-28-23 & reviewed all above & she understood.    I also told her  that Insurance denied surgery because they do not understand it is a pelvic fixation for Sacrl Insuff Fracture not an SI Joint Fusion & that  is trying to set up a peer review with insurance.  She will let pt know.   We will try to let daughter in law know if it gets approved or is denied & will have to be RSCd.    Call back prn. She agreed.    S.O./Sherie Lomas RN.

## 2023-11-29 ENCOUNTER — TELEPHONE (OUTPATIENT)
Dept: ORTHOPEDICS | Facility: CLINIC | Age: 78
End: 2023-11-29
Payer: COMMERCIAL

## 2023-11-29 NOTE — TELEPHONE ENCOUNTER
Phoned patient's daughter-in-law to discuss rescheduling Garth's surgery with Dr Hagen. Insurance requires at least 30 days to process the appeal, will need to schedule for some time in January. I left her my direct number to call back when she is able. [phone]

## 2023-11-30 NOTE — TELEPHONE ENCOUNTER
See phone message 11-30-23  from daughter in law after surgery date was canceled for Dec 5 because insurance is not approved yet.     did an appeal letter which PA dept sent to insurance 2 days ago.    PA dept stated [facility] surgery scheduler should RSC at least 30 days out to allow time for approval.    I called daughter in law back who stated she called insurance today who did not say if they had received the appeal letter but told Daughter in law they needed more info.    I told daughter in law that I will send a referral message to our PA team to have them confirm Insurance received appeal letter & try to speed up appeal since this is a Fracture & I will send message to [facility] surgery scheduler to call Daughter in law to RSC date.  Call back prn.  She agreed  Sherie Lomas RN.

## 2023-12-04 ENCOUNTER — TELEPHONE (OUTPATIENT)
Dept: ORTHOPEDICS | Facility: CLINIC | Age: 78
End: 2023-12-04
Payer: COMMERCIAL

## 2023-12-04 NOTE — TELEPHONE ENCOUNTER
Received voicemail message back from Y[facility] confirming surgery for Garth with Dr. Hagen on 1/2/24.

## 2023-12-04 NOTE — TELEPHONE ENCOUNTER
Phoned patient's daughter in law to reschedule Garth's surgery with Dr Hagen. Patient placed on the schedule for January 2, 2024 and will try to move patient up if authorization is obtained sooner. I left Melissa my direct number to call back to confirm she received the message. [phone]

## 2023-12-06 ENCOUNTER — VIRTUAL VISIT (OUTPATIENT)
Dept: ORTHOPEDICS | Facility: CLINIC | Age: 78
End: 2023-12-06
Payer: COMMERCIAL

## 2023-12-06 ENCOUNTER — PRE VISIT (OUTPATIENT)
Dept: ORTHOPEDICS | Facility: CLINIC | Age: 78
End: 2023-12-06

## 2023-12-06 ENCOUNTER — PREP FOR PROCEDURE (OUTPATIENT)
Dept: ORTHOPEDICS | Facility: CLINIC | Age: 78
End: 2023-12-06

## 2023-12-06 DIAGNOSIS — M84.48XK: ICD-10-CM

## 2023-12-06 DIAGNOSIS — M80.00XA AGE-RELATED OSTEOPOROSIS WITH CURRENT PATHOLOGICAL FRACTURE, INITIAL ENCOUNTER: Primary | ICD-10-CM

## 2023-12-06 PROCEDURE — 99204 OFFICE O/P NEW MOD 45 MIN: CPT | Mod: 93 | Performed by: FAMILY MEDICINE

## 2023-12-06 RX ORDER — TERIPARATIDE 250 UG/ML
20 INJECTION, SOLUTION SUBCUTANEOUS DAILY
Qty: 2.4 ML | Refills: 0 | Status: SHIPPED | OUTPATIENT
Start: 2023-12-06 | End: 2024-03-29

## 2023-12-06 NOTE — PROGRESS NOTES
SUBJECTIVE:    Garth Montgomery is a 78 year old female here today to [facility] bone health. Previous DEXA done at [facility] 11/29/23.  T-score showed her to be Osteoporosis.    Risk factors for osteroporosis include postmenopausal and  or .    Bone Health History:  - Referral: Jones Hagen MD  - Vitamin D Intake/Level: take 400IU tablet daily  - Calcium: 600mg Ca- VitD daily supplement, occasional yogurt, 2 meals per day, occasional chocolate/ sugary snacks  - Hx Fx's: current L sacral ala fx  - Activities: walking, but limited due to recent fx  - Current Meds: bupropion, tramadol, daily multivitamin, other daily vitamins as below  - tob use: none  - etoh use: none  - Caffeine use: ~3 cups coffee   - Hx Kidney Stones: none  - none family history of kidney stones  - Hx of Chemo, Skeletal Radiation, or Hormone Therapy: none  - Menstrual history: Menarche at 15, menopause 46, regular  - Weight loss: 20lbs   - Prolonged steroids: none  - Hx of GERD: heartburn occasionally, none recently  - Prior bisphosphonate: fosamax (~20-30 years ago for about 4 years)  - Family history of osteoporosis: none  - Heart disease: none  - CVA: none, but has hx of transient global amnesia episode few years ago      No past medical history on file.    Past Surgical History:   Procedure Laterality Date    HYSTERECTOMY      ORTHOPEDIC SURGERY      foot surgery    TKA         Current Outpatient Medications   Medication Sig Dispense Refill    acetaminophen (TYLENOL) 650 MG CR tablet Take 1,300 mg by mouth      amoxicillin (AMOXIL) 500 MG capsule Take 4 capsules by mouth one hour prior to Dental Appointment.      buPROPion (WELLBUTRIN XL) 150 MG 24 hr tablet Take 150 mg by mouth every morning      calcium citrate-vitamin D (CALCIUM CITRATE-VITAMIN D3) 315-6.25 MG-MCG TABS per tablet Take 1 tablet by mouth every morning      Cranberry (RA CRANBERRY) 500 MG CAPS Take 500 mg by mouth every morning      glucosamine-chondroitin 500-400 MG  "CAPS per capsule Take 1 capsule by mouth every morning      Magnesium Citrate 100 MG CAPS Take 1 capsule by mouth every morning      meloxicam (MOBIC) 7.5 MG tablet Take 7.5 mg by mouth every evening      multivitamin w/minerals (MULTI-VITAMIN) tablet Take 1 tablet by mouth every morning      potassium 99 MG TABS Take 1 tablet by mouth every morning      traMADol (ULTRAM) 50 MG tablet Take 50 mg by mouth every 6 hours as needed      vitamin B1 (THIAMINE) 50 MG tablet Take 100 mg by mouth every morning      vitamin C (ASCORBIC ACID) 500 MG tablet Take 500 mg by mouth every morning      Vitamin D, Cholecalciferol, 10 MCG (400 UNIT) CHEW Take by mouth every morning         Family History   Problem Relation Age of Onset    Anesthesia Reaction No family hx of     Deep Vein Thrombosis (DVT) No family hx of        Social History     Socioeconomic History    Marital status:      Spouse name: Not on file    Number of children: Not on file    Years of education: Not on file    Highest education level: Not on file   Occupational History    Not on file   Tobacco Use    Smoking status: Never     Passive exposure: Never    Smokeless tobacco: Never   Substance and Sexual Activity    Alcohol use: Not Currently    Drug use: Never    Sexual activity: Not on file   Other Topics Concern    Not on file   Social History Narrative    Not on file     Social Determinants of Health     Financial Resource Strain: Not on file   Food Insecurity: Not on file   Transportation Needs: Not on file   Physical Activity: Not on file   Stress: Not on file   Social Connections: Not on file   Interpersonal Safety: Not on file   Housing Stability: Not on file       OBJECTIVE:  There were no vitals taken for this visit.  There has been a change in patients height (lost about 2\" in last year)      Outside ([facility]) labs:  - Normal:   - 7/12/23: CBC w/ diff, CMP, Mg  - 8/2/23: TSH, PTH    DXA:  XR DEXA HIP AND SPINE  EXAM: XR DEXA HIP AND " SPINE  LOCATION: [facility]  DATE: 11/29/2023    INDICATION: Postmenopausal. Recent sacral fracture, orthopedist  requests DEXA be repeated. Closed fracture of sacrum, unspecified  fracture morphology, initial encounter ([facility]). Osteopenia of necks of  both femurs.    DEMOGRAPHICS: Age- 78 years. Gender- Female.  COMPARISON: 7/5/2022.  TECHNIQUE: Dual-energy x-ray absorptiometry (DXA) performed with  routine technique.    FINDINGS:    DXA RESULTS  -Lumbar Spine: L1-L3: BMD: 0.916 g/cm2. T-score: -2.2. Z-score: -0.3.  -RIGHT Hip Total: BMD: 0.714 g/cm2. T-score: -2.3. Z-score: -0.4.  -RIGHT Hip Femoral neck: BMD: 0.857 g/cm2. T-score: -1.3. Z-score:  0.8.  -LEFT Hip Total: BMD: 0.685 g/cm2. T-score: -2.6. Z-score: -0.6.  -LEFT Hip Femoral neck: BMD: 0.779 g/cm2. T-score: -1.9. Z-score: 0.2.    WHO T-SCORE CRITERIA  -Normal: T score at or above -1 SD  -Osteopenia: T score between -1 and -2.5 SD  -Osteoporosis: T score at or below -2.5 SD    The World Health Organization (WHO) criteria is applicable to  perimenopausal females, postmenopausal females, and men aged 50 years  or older.    INTERVAL CHANGE  -There has been a 0.3% increase in L1-L4 BMD.  -There has been a 15.3% decrease in bilateral hip BMD.    FRACTURE RISK  -The FRAX risk calculator is not applicable due to osteoporosis.    RECOMMENDATIONS  The patient's BMD is consistent with osteoporosis, and he/she is at  increased fracture risk. If not currently being treated for low BMD,  this would merit treatment according to the Bone Health and  Osteoporosis Foundation.    IMPRESSION: OSTEOPOROSIS. T score meets the WHO criteria for  osteoporosis at one or more measured sites. The risk of osteoporotic  fracture increases approximately two-fold for each standard deviation  decrease in T-score.    ASSESSMENT:  Severe osteoporosis based on T-score (L Hip Total: T-score: -2.6) with current insufficiency fracture  L sacral flores insufficiency fx  Weight loss,  likely due to nutritional deficiency and loss of appetite      PLAN:  - recheck CBC, BMP, Vitamin D (will fax to be done at PCP's office)  - evenity would be reasonable, but would logistically be difficult due to patient not living in [facility] and needing monthly commutes to injections or sorting out an office for injections to be done closer to home.   - will seek prior auth for forteo  - recommend seeking out nutritionist in area to work with given weight loss and limited intake recently  - also recommend 4 servings of Ca daily, ideally through food, but can use supplement if needed    The patient was seen by and discussed with attending physician Dr.Suzanne SAMUEL Mccollum MD, CAQ, CCD, who agrees with the plan as stated unless otherwise stated.     Ajay Fields MD   Primary Care Sports Medicine Fellow  [facility]    Telephone Time: 39 minutes

## 2023-12-06 NOTE — LETTER
12/6/2023       RE: Garth Montgomery  [address]  [facility] [address]     Dear Colleague,    Thank you for referring your patient, Garth Montgomery, to the [facility] SP[facility] [place] at [facility]. Please see a copy of my visit note below.    SUBJECTIVE:    Garth Montgomery is a 78 year old female here today to [facility] bone health. Previous DEXA done at [facility] 11/29/23.  T-score showed her to be Osteoporosis.    Risk factors for osteroporosis include postmenopausal and  or .    Bone Health History:  - Referral: Jones Hagen MD  - Vitamin D Intake/Level: take 400IU tablet daily  - Calcium: 600mg Ca- VitD daily supplement, occasional yogurt, 2 meals per day, occasional chocolate/ sugary snacks  - Hx Fx's: current L sacral ala fx  - Activities: walking, but limited due to recent fx  - Current Meds: bupropion, tramadol, daily multivitamin, other daily vitamins as below  - tob use: none  - etoh use: none  - Caffeine use: ~3 cups coffee   - Hx Kidney Stones: none  - none family history of kidney stones  - Hx of Chemo, Skeletal Radiation, or Hormone Therapy: none  - Menstrual history: Menarche at 15, menopause 46, regular  - Weight loss: 20lbs   - Prolonged steroids: none  - Hx of GERD: heartburn occasionally, none recently  - Prior bisphosphonate: fosamax (~20-30 years ago for about 4 years)  - Family history of osteoporosis: none  - Heart disease: none  - CVA: none, but has hx of transient global amnesia episode few years ago      No past medical history on file.    Past Surgical History:   Procedure Laterality Date     HYSTERECTOMY       ORTHOPEDIC SURGERY      foot surgery     TKA         Current Outpatient Medications   Medication Sig Dispense Refill     acetaminophen (TYLENOL) 650 MG CR tablet Take 1,300 mg by mouth       amoxicillin (AMOXIL) 500 MG capsule Take 4 capsules by mouth one hour prior to Dental Appointment.       buPROPion  (WELLBUTRIN XL) 150 MG 24 hr tablet Take 150 mg by mouth every morning       calcium citrate-vitamin D (CALCIUM CITRATE-VITAMIN D3) 315-6.25 MG-MCG TABS per tablet Take 1 tablet by mouth every morning       Cranberry (RA CRANBERRY) 500 MG CAPS Take 500 mg by mouth every morning       glucosamine-chondroitin 500-400 MG CAPS per capsule Take 1 capsule by mouth every morning       Magnesium Citrate 100 MG CAPS Take 1 capsule by mouth every morning       meloxicam (MOBIC) 7.5 MG tablet Take 7.5 mg by mouth every evening       multivitamin w/minerals (MULTI-VITAMIN) tablet Take 1 tablet by mouth every morning       potassium 99 MG TABS Take 1 tablet by mouth every morning       traMADol (ULTRAM) 50 MG tablet Take 50 mg by mouth every 6 hours as needed       vitamin B1 (THIAMINE) 50 MG tablet Take 100 mg by mouth every morning       vitamin C (ASCORBIC ACID) 500 MG tablet Take 500 mg by mouth every morning       Vitamin D, Cholecalciferol, 10 MCG (400 UNIT) CHEW Take by mouth every morning         Family History   Problem Relation Age of Onset     Anesthesia Reaction No family hx of      Deep Vein Thrombosis (DVT) No family hx of        Social History     Socioeconomic History     Marital status:      Spouse name: Not on file     Number of children: Not on file     Years of education: Not on file     Highest education level: Not on file   Occupational History     Not on file   Tobacco Use     Smoking status: Never     Passive exposure: Never     Smokeless tobacco: Never   Substance and Sexual Activity     Alcohol use: Not Currently     Drug use: Never     Sexual activity: Not on file   Other Topics Concern     Not on file   Social History Narrative     Not on file     Social Determinants of Health     Financial Resource Strain: Not on file   Food Insecurity: Not on file   Transportation Needs: Not on file   Physical Activity: Not on file   Stress: Not on file   Social Connections: Not on file   Interpersonal  "Safety: Not on file   Housing Stability: Not on file       OBJECTIVE:  There were no vitals taken for this visit.  There has been a change in patients height (lost about 2\" in last year)      Outside ([facility]) labs:  - Normal:   - 7/12/23: CBC w/ diff, CMP, Mg  - 8/2/23: TSH, PTH    DXA:  XR DEXA HIP AND SPINE  EXAM: XR DEXA HIP AND SPINE  LOCATION: [facility]  DATE: 11/29/2023    INDICATION: Postmenopausal. Recent sacral fracture, orthopedist  requests DEXA be repeated. Closed fracture of sacrum, unspecified  fracture morphology, initial encounter ([facility]). Osteopenia of necks of  both femurs.    DEMOGRAPHICS: Age- 78 years. Gender- Female.  COMPARISON: 7/5/2022.  TECHNIQUE: Dual-energy x-ray absorptiometry (DXA) performed with  routine technique.    FINDINGS:    DXA RESULTS  -Lumbar Spine: L1-L3: BMD: 0.916 g/cm2. T-score: -2.2. Z-score: -0.3.  -RIGHT Hip Total: BMD: 0.714 g/cm2. T-score: -2.3. Z-score: -0.4.  -RIGHT Hip Femoral neck: BMD: 0.857 g/cm2. T-score: -1.3. Z-score:  0.8.  -LEFT Hip Total: BMD: 0.685 g/cm2. T-score: -2.6. Z-score: -0.6.  -LEFT Hip Femoral neck: BMD: 0.779 g/cm2. T-score: -1.9. Z-score: 0.2.    WHO T-SCORE CRITERIA  -Normal: T score at or above -1 SD  -Osteopenia: T score between -1 and -2.5 SD  -Osteoporosis: T score at or below -2.5 SD    The World Health Organization (WHO) criteria is applicable to  perimenopausal females, postmenopausal females, and men aged 50 years  or older.    INTERVAL CHANGE  -There has been a 0.3% increase in L1-L4 BMD.  -There has been a 15.3% decrease in bilateral hip BMD.    FRACTURE RISK  -The FRAX risk calculator is not applicable due to osteoporosis.    RECOMMENDATIONS  The patient's BMD is consistent with osteoporosis, and he/she is at  increased fracture risk. If not currently being treated for low BMD,  this would merit treatment according to the Bone Health and  Osteoporosis Foundation.    IMPRESSION: OSTEOPOROSIS. T score meets the WHO criteria " for  osteoporosis at one or more measured sites. The risk of osteoporotic  fracture increases approximately two-fold for each standard deviation  decrease in T-score.    ASSESSMENT:  Severe osteoporosis based on T-score (L Hip Total: T-score: -2.6) with current insufficiency fracture  L sacral flores insufficiency fx  Weight loss, likely due to nutritional deficiency and loss of appetite      PLAN:  - recheck CBC, BMP, Vitamin D (will fax to be done at PCP's office)  - evenity would be reasonable, but would logistically be difficult due to patient not living in [facility] and needing monthly commutes to injections or sorting out an office for injections to be done closer to home.   - will seek prior auth for forteo  - recommend seeking out nutritionist in area to work with given weight loss and limited intake recently  - also recommend 4 servings of Ca daily, ideally through food, but can use supplement if needed    The patient was seen by and discussed with attending physician Dr.Suzanne SAMUEL Mccollum MD, CAQ, CCD, who agrees with the plan as stated unless otherwise stated.     Ajay Fields MD   Primary Care Sports Medicine Fellow  [facility]    Telephone Time: 39 minutes    Attending Note:   I have talked with this patient along with Dr. Fields via telephone visit and have reviewed the clinical presentation and watched the video examination with the fellow. I agree with the treatment plan as outlined. The plan was formulated with the fellow on the day of the patient's visit.   Brittney Mccollum MD, CAQ, CCD  [facility]  Sports Medicine and Bone Health      Again, thank you for allowing me to participate in the care of your patient.      Sincerely,    Brittney Mccollum MD

## 2023-12-07 ENCOUNTER — PREP FOR PROCEDURE (OUTPATIENT)
Dept: ORTHOPEDICS | Facility: CLINIC | Age: 78
End: 2023-12-07
Payer: COMMERCIAL

## 2023-12-07 DIAGNOSIS — M84.48XK: Primary | ICD-10-CM

## 2023-12-08 NOTE — PROGRESS NOTES
Attending Note:   I have talked with this patient along with Dr. Fields via telephone visit and have reviewed the clinical presentation and watched the video examination with the fellow. I agree with the treatment plan as outlined. The plan was formulated with the fellow on the day of the patient's visit.   Brittney Mccollum MD, CAQ, CCD  [facility]  Sports Medicine and Bone Health

## 2023-12-19 ENCOUNTER — TELEPHONE (OUTPATIENT)
Dept: ORTHOPEDICS | Facility: CLINIC | Age: 78
End: 2023-12-19
Payer: COMMERCIAL

## 2023-12-19 NOTE — TELEPHONE ENCOUNTER
Call came in from pt's Daughter In Law, asking the following:    Does pt still need a PRE OP Anesthesiology appointment?    Also, has the team heard back from pt's insurance company? Has pt's surgery been approved for Prior Authorization?    Pt does NOT have a Consent To Communicate ON FILE, but pt's Daughter In Law is pt's emergency contact.    Please CALL pt's Daughter In Law to discuss her questions. If consent IS needed, please join pt into the call to get authorization. Thank you.

## 2023-12-20 NOTE — TELEPHONE ENCOUNTER
See phone message from dtr in law.  I called back & left VM & I called pt & spoke to pt directly.  Scheduled Video Repeat Preop H&P with PAC clinic for tomorrow 12-21-23.  Already seen by PAC clinic & Labs done but then Surgery RSC to Jan 2 due to insurance approval pending.    I left message that I will keep trying to get a hold of dtr in law to let her know I made appt. & that we sent a message to our PA team to see if surgery is approved yet.  I also stated that Dtr in law should also call insurance to verify.  Call back prn. Sherie Lomas RN.

## 2023-12-20 NOTE — TELEPHONE ENCOUNTER
See my previous Triage message. I finally did get a hold of dtr in law today 12-20-23 who stated tomorrow for video PAC preop appt does not work so she RSC to next week.  She will check with insurance about approval.  Call back prn.  Dtr in law agreed.    Sherie Lomas RN.

## 2023-12-20 NOTE — TELEPHONE ENCOUNTER
FUTURE VISIT INFORMATION      SURGERY INFORMATION:  Date: 24  Location: [facility] OR  Surgeon:  Jones Hagen MD   Anesthesia Type:  general  Procedure: Percutaneous skeletal fixation of posterior pelvic bone fracture   RECORDS REQUESTED FROM:       Primary Care Provider: Lizzie Ruelas APRN,CNP  - Claudette[facility]ajosue    Most recent EKG+ Tracin23- [place]

## 2023-12-21 ENCOUNTER — PRE VISIT (OUTPATIENT)
Dept: SURGERY | Facility: CLINIC | Age: 78
End: 2023-12-21

## 2023-12-21 ENCOUNTER — VIRTUAL VISIT (OUTPATIENT)
Dept: SURGERY | Facility: CLINIC | Age: 78
End: 2023-12-21
Payer: COMMERCIAL

## 2023-12-21 DIAGNOSIS — Z01.818 PREOP EXAMINATION: Primary | ICD-10-CM

## 2023-12-21 DIAGNOSIS — M84.48XK SACRAL INSUFFICIENCY FRACTURE WITH NONUNION: ICD-10-CM

## 2023-12-21 PROCEDURE — 99213 OFFICE O/P EST LOW 20 MIN: CPT | Mod: VID

## 2023-12-21 ASSESSMENT — LIFESTYLE VARIABLES: TOBACCO_USE: 0

## 2023-12-21 ASSESSMENT — ENCOUNTER SYMPTOMS: SEIZURES: 0

## 2023-12-21 NOTE — PATIENT INSTRUCTIONS
Preparing for Your Surgery      Name:  Garth Montgomery   MRN:  [id number]   :  1945   Today's Date:  2023       Arriving for surgery:  Surgery date:  24  Arrival time:  8.55AM    Please come to:     Please come to:      [facility] [address]  [address] [place]  [address]  The Green Ramp for patients and visitors is located beneath the [facility]. The parking facility entrance is at the intersection of [address] [place] [address] and [address]. Patients and visitors who self-park will receive the reduced hospital parking rate (no ticket validation needed).  [employer] parking, located at the [facility] main entrance on [address] [place] [address], is available Monday - Friday from 7 am to 3:30 pm.  Discounted parking pass options can be purchased from  attendants during business hours.  -Check in at the security desk in the [facility] ([facility]) Lobby. They will direct you to the correct elevators.  -Proceed to the 3rd floor, check in at the Adult Surgery Waiting Lounge. [phone]  If you are in need of directions, a wheelchair or escort please stop at the Information Desk in the lobby.  Inform the information person that you are here for surgery; a wheelchair and escort to [address] will be provided.   An escort to the Adult Surgery Waiting Lounge will be provided.    What can I eat or drink?  -  You may eat and drink normally up to 8 hours prior to arrival time. (Until 12.05AM)  -  You may have clear liquids until 2 hours prior to arrival time. (Until 6.55AM)    Examples of clear liquids:  Water  Clear broth  Juices (apple, white grape, white cranberry  and cider) without pulp  Noncarbonated, powder based beverages  (lemonade and Krystian-Aid)  Sodas (Sprite, 7-Up, ginger ale and seltzer)  Coffee or tea (without milk or cream)  Gatorade    -  No Alcohol or cannabis  products for at least 24 hours before surgery.     Which medicines can I take?    Hold Aspirin for 7 days before surgery.   Hold Multivitamins for 7 days before surgery. (Vitamin C, D, B1)  Hold Supplements for 7 days before surgery. (Caltrate, Cranberry, Glucosamine-Chondroitin, Magnesium,  Hold Ibuprofen (Advil, Motrin) for 3 day(s) before surgery--unless otherwise directed by surgeon.  Hold Naproxen (Aleve) for 4 days before surgery.    -  DO NOT take these medications the day of surgery:  Potassium    -  PLEASE TAKE these medications the day of surgery:  Tylenol as needed.  Wellbutrin  Tramadol as needed.    How do I prepare myself?  - Please take 2 showers (one the night prior to surgery and one the morning of surgery) using Scrubcare or Hibiclens soap.    Use this soap only from the neck to your toes.     Leave the soap on your skin for one minute--then rinse thoroughly.      You may use your own shampoo and conditioner. No other hair products.   - Please remove all jewelry and body piercings.  - No lotions, deodorants or fragrance.  - No makeup or fingernail polish.   - Bring your ID and insurance card.    -If you use a CPAP machine, please bring the CPAP machine, tubing, and mask to hospital.    -If you have a Deep Brain Stimulator, Spinal Cord Stimulator, or any Neuro Stimulator device---you must bring the remote control to the hospital.      ALL PATIENTS GOING HOME THE SAME DAY OF SURGERY ARE REQUIRED TO HAVE A RESPONSIBLE ADULT TO DRIVE AND BE IN ATTENDANCE WITH THEM FOR 24 HOURS FOLLOWING SURGERY.    Covid testing policy as of 12/06/2022  Your surgeon will notify and schedule you for a COVID test if one is needed before surgery--please direct any questions or COVID symptoms to your surgeon      Questions or Concerns:    - For any questions regarding the day of surgery or your hospital stay, please contact the Pre Admission Nursing Office at [phone].       - If you have health changes between today  and your surgery, please call your surgeon.       - For questions after surgery, please call your surgeons office.           Current Visitor Guidelines    You may have 2 visitors in the pre op area.    Visiting hours: 8 a.m. to 8:30 p.m.    You may have four visitors during your inpatient hospital stay.    Patients confirmed or suspected to have symptoms of COVID 19 or flu:     No visitors allowed for adult patients.   Children (under age 18) can have 1 named visitor.     People who are sick or showing symptoms of COVID 19 or flu:    Are not allowed to visit patients--we can only make exceptions in special situations.       Please follow these guidelines for your visit:          Please maintain social distance          Masking is optional--however at times you may be asked to wear a mask for the safety of yourself and others     Clean your hands with alcohol hand . Do this when you arrive at and leave the building and patient room,    And again after you touch your mask or anything in the room.     Go directly to and from the room you are visiting.     Stay in the patient s room during your visit. Limit going to other places in the hospital as much as possible     Leave bags and jackets at home or in the car.     For everyone s health, please don t come and go during your visit. That includes for smoking   during your visit.

## 2023-12-21 NOTE — H&P
Pre-Operative H & P     CC:  Preoperative exam to assess for increased cardiopulmonary risk while undergoing surgery and anesthesia.    Date of Encounter: 12/21/2023  Primary Care Physician:  Lizzie Ruelas     Reason for visit:   Encounter Diagnoses   Name Primary?    Preop examination Yes    Sacral insufficiency fracture with nonunion        HPI  Garth Montgomery is a 78 year old female who presents for pre-operative H & P in preparation for  Procedure Information       Case: [id number] Date/Time: 01/02/24 1125    Procedure: Percutaneous skeletal fixation of posterior pelvic bone fracture (Left: Sacrum)    Anesthesia type: General    Diagnosis: Sacral insufficiency fracture, with nonunion, subsequent encounter [M84.48XK]    Pre-op diagnosis: Sacral insufficiency fracture, with nonunion, subsequent encounter [M84.48XK]    Location:  OR 02 / [facility] OR    Providers: Jones Hagen MD            Patient is being evaluated for comorbid conditions of dyslipidemia, depression, anxiety     Ms. Flores has a history of a sacral insufficiency fracture and right sided pedicle fracture. She was seen by Dr. Hagen for further evaluation and is now scheduled for the above procedure.     History is obtained from the patient, patient's daughter-in-law (Melissa), and chart review    Hx of abnormal bleeding or anti-platelet use: None.    Menstrual history: No LMP recorded. Patient has had a hysterectomy.:     Past Medical History  No past medical history on file.    Past Surgical History  Past Surgical History:   Procedure Laterality Date    HYSTERECTOMY      ORTHOPEDIC SURGERY      foot surgery    TKA Right        Prior to Admission Medications  Current Outpatient Medications   Medication Sig Dispense Refill    acetaminophen (TYLENOL) 650 MG CR tablet Take 1,300 mg by mouth every 8 hours as needed      buPROPion (WELLBUTRIN XL) 150 MG 24 hr tablet Take 150 mg by mouth every morning      calcium citrate-vitamin D (CALCIUM  CITRATE-VITAMIN D3) 315-6.25 MG-MCG TABS per tablet Take 1 tablet by mouth every morning      Cranberry (RA CRANBERRY) 500 MG CAPS Take 500 mg by mouth every morning      glucosamine-chondroitin 500-400 MG CAPS per capsule Take 1 capsule by mouth every morning      Magnesium Citrate 100 MG CAPS Take 1 capsule by mouth every morning      multivitamin w/minerals (MULTI-VITAMIN) tablet Take 1 tablet by mouth every morning      potassium 99 MG TABS Take 1 tablet by mouth every morning      teriparatide, recombinant, (FORTEO) 600 MCG/2.4ML SOPN injection Inject 0.08 mLs (20 mcg) Subcutaneous daily for 30 days (Patient taking differently: Inject 20 mcg Subcutaneous every 28 days Next dose 1/11/24) 2.4 mL 0    traMADol (ULTRAM) 50 MG tablet Take 50 mg by mouth every 6 hours as needed      vitamin B1 (THIAMINE) 50 MG tablet Take 100 mg by mouth every morning      vitamin C (ASCORBIC ACID) 500 MG tablet Take 500 mg by mouth every morning      Vitamin D, Cholecalciferol, 10 MCG (400 UNIT) CHEW Take by mouth every morning         Allergies  No Known Allergies    Social History  Social History     Socioeconomic History    Marital status:      Spouse name: Not on file    Number of children: Not on file    Years of education: Not on file    Highest education level: Not on file   Occupational History    Not on file   Tobacco Use    Smoking status: Never     Passive exposure: Never    Smokeless tobacco: Never   Substance and Sexual Activity    Alcohol use: Not Currently    Drug use: Never    Sexual activity: Not on file   Other Topics Concern    Not on file   Social History Narrative    Not on file     Social Determinants of Health     Financial Resource Strain: Not on file   Food Insecurity: Not on file   Transportation Needs: Not on file   Physical Activity: Not on file   Stress: Not on file   Social Connections: Not on file   Interpersonal Safety: Not on file   Housing Stability: Not on file       Family History  Family  History   Problem Relation Age of Onset    Anesthesia Reaction No family hx of     Deep Vein Thrombosis (DVT) No family hx of        Review of Systems  The complete review of systems is negative other than noted in the HPI or here.   Anesthesia Evaluation   Pt has had prior anesthetic.     No history of anesthetic complications       ROS/MED HX  ENT/Pulmonary:  - neg pulmonary ROS  (-) tobacco use and MAGGI risk factors   Neurologic:  - neg neurologic ROS  (-) no seizures and no CVA   Cardiovascular:     (+)  - -   -  - -                                 Previous cardiac testing   Echo: Date: 3/19/19 Results:  TRANSTHORACIC ECHOCARDIOGRAM REPORT     INDICATION:  Acute confusion, TIA.     PROCEDURE:  Transthoracic, two-dimensional, and M-mode echocardiography was performed from the parasternal, apical, and subcostal windows.  Simultaneous pulsed-wave, continuous wave and color flow Doppler was performed.      IMAGE QUALITY:    --    ECG RHYTHM:    --     RESULTS:   LEFT ATRIUM:    The left atrium is moderately enlarged.     MITRAL VALVE:    The mitral valve is structurally normal.  There is mild regurgitation.      LEFT VENTRICLE:    LV size and function are normal.  Ejection fraction 60-65%.  No regional wall motion abnormalities.      AORTIC VALVE:    The aortic valve is trileaflet, noncalcified.   There is no aortic stenosis or regurgitation.     AORTA:    Normal     RIGHT ATRIUM:    The right atrium is normal in size.      TRICUSPID VALVE:     Normal with trace TR     RIGHT VENTRICLE:    The right ventricle is normal in size and function.  PA pressures are estimated at 33 mmHg plus right atrial pressure.      PULMONIC VALVE:   Normal      PERICARDIUM:    No pericardial effusion.     ADDITIONAL COMMENTS:    The interatrial septum appears to be intact.     CONCLUSION:   1. Ejection fraction 60-65%.       Stress Test:  Date: Results:    ECG Reviewed:  Date: 10/2022 Results:  NSR  Cath:  Date: Results:   (-) taking  anticoagulants/antiplatelets   METS/Exercise Tolerance: 3 - Able to walk 1-2 blocks without stopping Comment: Recently has decreased activity level due to hip pain. Was able to complete exercise classes twice weekly- walking and leg/arm exercises,walk multiple blocks, ascend stairs without exertional symptoms over the summer.    Hematologic:  - neg hematologic  ROS  (-) history of blood clots and history of blood transfusion   Musculoskeletal: Comment: - Sacral insufficiency  - s/p right TKA  - Scoliosis of lumbar spine     - Using cane/walker  - Denies recent falls     (+)  arthritis (of hips),             GI/Hepatic:  - neg GI/hepatic ROS  (-) GERD   Renal/Genitourinary:  - neg Renal ROS     Endo: Comment: - Osteoporosis managed on forteo    (-) chronic steroid usage   Psychiatric/Substance Use:     (+) psychiatric history anxiety and depression       Infectious Disease:  - neg infectious disease ROS     Malignancy:  - neg malignancy ROS     Other:            Virtual visit -  No vitals were obtained    Physical Exam  Constitutional: Awake, alert, cooperative, no apparent distress, and appears stated age.  Eyes: Pupils equal  HENT: Normocephalic  Respiratory: non labored breathing   Neurologic: Awake, alert, oriented to name, place and time.   Neuropsychiatric: Calm, cooperative. Normal affect.      Prior Labs/Diagnostic Studies   All labs and imaging personally reviewed     Component  Ref Range & Units 3 wk ago   Creatinine  0.57 - 1.11 mg/dL 0.68   eGFR  >=60 mL/min/1.73m(2) >60   Comment: Calculation based on the Chronic Kidney Disease Epidemiology Collaboration (CKD-EPI) equation refit without adjustment for race.   eCrCl (Rx) - Adults  mL/min 67.2   Resulting Agency [facility]     Specimen Collected: 11/29/23  9:47 AM    Performed by: [facility] Last Resulted: 11/29/23 10:07 AM   Received From: [facility]  Result Received: 12/05/23 10:36 AM        HEMOGLOBIN  Order: [id number]  Component  Ref Range & Units 3 wk ago   Hemoglobin  11.8 - 15.8 g/dL 13.2   Resulting Agency [facility]     Specimen Collected: 11/29/23  9:47 AM    Performed by: [facility] Last Resulted: 11/29/23  9:53 AM   Received From: [facility]  Result Received: 12/05/23 10:36 AM       Component  Ref Range & Units 3 wk ago   Potassium  3.5 - 5.1 mmol/L 3.7   Resulting Agency [facility]     Specimen Collected: 11/29/23  9:47 AM    Performed by: [facility] Last Resulted: 11/29/23 10:07 AM   [facility]  Result Received: 12/05/23 10:36 AM     Component  Ref Range & Units 5 mo ago   Sodium  136 - 146 mmol/L 139   Potassium  3.5 - 5.1 mmol/L 4.6   Chloride  98 - 107 mmol/L 103   CO2  22 - 29 mmol/L 27   Creatinine  0.57 - 1.11 mg/dL 0.73   Blood Urea Nitrogen  7.0 - 20.1 mg/dL 12.0   Calcium, Total  8.6 - 10.5 mg/dL 10.1   Glucose  70 - 100 mg/dL 97   eGFR  >=60 mL/min/1.73m(2) >60   Comment: Calculation based on the Chronic Kidney Disease Epidemiology Collaboration (CKD-EPI) equation refit without adjustment for race.  GFR reference range is not established in patients >70 years old.   Total Protein  6.0 - 8.0 g/dL 7.4   Albumin  3.4 - 4.8 g/dL 4.7   Globulin  g/dL 2.7   Albumin/Globulin Ratio 1.7   Aspartate Aminotransferase (AST)  5 - 41 U/L 16   Alanine Aminotransferase (ALT)  0 - 55 U/L 13   Alkaline Phosphatase  40 - 136 U/L 54   Bilirubin, Total  0.2 - 1.2 mg/dL 0.4   eCrCl (Rx) - Adults  mL/min 69.2   Fasting Status N/A   Resulting Agency [facility]     Specimen Collected: 07/12/23  3:25 PM    Performed by: [facility] Last Resulted: 07/12/23  4:07 PM   [facility]  Result Received: 11/15/23 10:02 AM     Component  Ref Range & Units 5 mo ago   WBC Count  4.0 - 11.0 10(3)/uL 6.3    RBC Count  3.90 - 5.20 10(6)/uL 4.35   Hemoglobin  11.8 - 15.8 g/dL 13.5   Hematocrit  35.0 - 45.0 % 40.2   MCV  81.0 - 99.0 fL 92.4   MCH  28.0 - 33.0 pg 31.0   MCHC  32.0 - 36.0 g/dL 33.6   RDW  11.0 - 14.5 % 12.8   Platelets  130 - 450 10(3)/uL 299   MPV  6.5 - 11.0 fL 9.7   % Neutrophils  40.0 - 75.0 % 73.4   % Lymphocytes  15.0 - 50.0 % 16.0   % Monocytes  2.0 - 13.0 % 9.3   % Eosinophils  0.0 - 8.0 % 0.8   % Basophils  0.0 - 2.0 % 0.5   Abs Neutrophils  2.0 - 7.8 10(3)/uL 4.6   Abs Lymphocytes  1.0 - 4.0 10(3)/uL 1.0   Abs Monocytes  0.1 - 1.0 10(3)/uL 0.6   Abs Eosinophils  0.0 - 0.7 10(3)/uL 0.1   Abs Basophils  0.0 - 0.2 10(3)/uL 0.0   Resulting Agency [facility]     Specimen Collected: 07/12/23  3:25 PM    Performed by: [facility] Last Resulted: 07/12/23  3:31 PM   [facility] From: [facility]  Result Received: 11/15/23 10:02 AM     EKG/ stress test - if available please see in ROS above     Labs today: Virtual visit and patient lives out of town. Recent labs from 11/29/23 WNL. Labs per anesthesia/surgery team on DOS.     The patient's records and results personally reviewed by this provider.     Outside records reviewed from: Care Everywhere      Assessment    Garth Montgomery is a 78 year old female seen as a PAC referral for risk assessment and optimization for anesthesia.    Plan/Recommendations  Pt will be optimized for the proposed procedure.  See below for details on the assessment, risk, and preoperative recommendations    NEUROLOGY  - No history of TIA, CVA or seizure  -Post Op delirium risk factors:  Age    ENT  - No current airway concerns.  Will need to be reassessed day of surgery.  Mallampati: Unable to assess  TM: Unable to assess    CARDIAC  - No history of CAD, Hypertension, and Afib Patient denies any symptoms of CP, chest tightness, or SOB.    - METS (Metabolic Equivalents)  Patient CANNOT perform 4 METS exercise without symptoms   "          Total Score: 1    Functional Capacity: Unable to complete 4 METS      RCRI-Very low risk: Class 1 0.4% complication rate            Total Score: 0        PULMONARY  MAGGI Low Risk            Total Score: 1    MAGGI: Over 50 ys old      - Denies asthma or inhaler use  - Tobacco History    History   Smoking Status    Never   Smokeless Tobacco    Never       GI  PONV High Risk  Total Score: 3           1 AN PONV: Pt is Female    1 AN PONV: Patient is not a current smoker    1 AN PONV: Intended Post Op Opioids        /RENAL  - Baseline Creatinine  0.73    ENDOCRINE    - BMI: Estimated body mass index is 26.01 kg/m  as calculated from the following:    Height as of 11/16/23: 1.54 m (5' 0.63\").    Weight as of 11/16/23: 61.7 kg (136 lb).  Overweight (BMI 25.0-29.9)  - No history of Diabetes Mellitus    - Osteoporosis managed on Forteo, patient reports her first dose was 2 weeks ago.     HEME  VTE Low Risk 0.26%            Total Score: 1    VTE: Greater than 59 yrs old      - No history of abnormal bleeding or antiplatelet use.    MSK  - Sacral insufficiency (see HPI) - surgery planned as above  - s/p right TKA  - Scoliosis of lumbar spine     PSYCH  - Hx of depression and anxiety managed on bupropion. Patient reports stable symptoms - continue on DOS.     Different anesthesia methods/types have been discussed with the patient, but they are aware that the final plan will be decided by the assigned anesthesia provider on the date of service.    The patient is optimized for their procedure. AVS with information on surgery time/arrival time, meds and NPO status given by nursing staff. No further diagnostic testing indicated.    Please refer to the physical examination documented by the anesthesiologist in the anesthesia record on the day of surgery.    Video-Visit Details    Type of service:  Video Visit    Provider received verbal consent for a Video Visit from the patient? Yes     Originating Location (pt. " Location): Home    Distant Location (provider location):  Off-site  Mode of Communication:  Video Conference via [employer]  On the day of service:     Prep time: 10 minutes  Visit time: 9 minutes  Documentation time: 10 minutes  ------------------------------------------  Total time: 29 minutes      ROGER Rascon CNP  Preoperative Assessment Center  [facility]  Clinic and [facility] Center  Phone: [phone]  Fax: [phone]

## 2023-12-21 NOTE — PROGRESS NOTES
Garth is a 78 year old who is being evaluated via a billable video visit.      How would you like to obtain your AVS? Obeyhart      Subjective   Garth is a 78 year old, presenting for the following health issues:  Pre-Op Exam (/)          GABRIEL Lyon LPN

## 2023-12-31 ENCOUNTER — HEALTH MAINTENANCE LETTER (OUTPATIENT)
Age: 78
End: 2023-12-31

## 2024-01-02 ENCOUNTER — ANESTHESIA (OUTPATIENT)
Dept: SURGERY | Facility: CLINIC | Age: 79
End: 2024-01-02
Payer: COMMERCIAL

## 2024-01-02 ENCOUNTER — APPOINTMENT (OUTPATIENT)
Dept: GENERAL RADIOLOGY | Facility: CLINIC | Age: 79
End: 2024-01-02
Attending: ORTHOPAEDIC SURGERY
Payer: COMMERCIAL

## 2024-01-02 ENCOUNTER — HOSPITAL ENCOUNTER (OUTPATIENT)
Facility: CLINIC | Age: 79
Discharge: NURSING, REHAB OR LONG-TERM CARE | End: 2024-01-05
Attending: ORTHOPAEDIC SURGERY | Admitting: ORTHOPAEDIC SURGERY
Payer: COMMERCIAL

## 2024-01-02 ENCOUNTER — TELEPHONE (OUTPATIENT)
Dept: NURSING | Facility: CLINIC | Age: 79
End: 2024-01-02

## 2024-01-02 DIAGNOSIS — M84.454K INSUFFICIENCY FRACTURE OF PELVIS WITH NONUNION, SUBSEQUENT ENCOUNTER: Primary | ICD-10-CM

## 2024-01-02 DIAGNOSIS — Z98.1 S/P FUSION OF SACROILIAC JOINT: ICD-10-CM

## 2024-01-02 PROBLEM — S32.9XXA CLOSED PELVIC FRACTURE (H): Status: ACTIVE | Noted: 2024-01-02

## 2024-01-02 PROCEDURE — 250N000011 HC RX IP 250 OP 636: Performed by: NURSE ANESTHETIST, CERTIFIED REGISTERED

## 2024-01-02 PROCEDURE — 250N000009 HC RX 250: Performed by: NURSE ANESTHETIST, CERTIFIED REGISTERED

## 2024-01-02 PROCEDURE — 99204 OFFICE O/P NEW MOD 45 MIN: CPT | Performed by: STUDENT IN AN ORGANIZED HEALTH CARE EDUCATION/TRAINING PROGRAM

## 2024-01-02 PROCEDURE — 999N000141 HC STATISTIC PRE-PROCEDURE NURSING ASSESSMENT: Performed by: ORTHOPAEDIC SURGERY

## 2024-01-02 PROCEDURE — 370N000017 HC ANESTHESIA TECHNICAL FEE, PER MIN: Performed by: ORTHOPAEDIC SURGERY

## 2024-01-02 PROCEDURE — 999N000180 XR SURGERY CARM FLUORO LESS THAN 5 MIN: Mod: TC

## 2024-01-02 PROCEDURE — 250N000025 HC SEVOFLURANE, PER MIN: Performed by: ORTHOPAEDIC SURGERY

## 2024-01-02 PROCEDURE — 272N000001 HC OR GENERAL SUPPLY STERILE: Performed by: ORTHOPAEDIC SURGERY

## 2024-01-02 PROCEDURE — 250N000011 HC RX IP 250 OP 636: Performed by: PHYSICIAN ASSISTANT

## 2024-01-02 PROCEDURE — 710N000010 HC RECOVERY PHASE 1, LEVEL 2, PER MIN: Performed by: ORTHOPAEDIC SURGERY

## 2024-01-02 PROCEDURE — 27216 TREAT PELVIC RING FRACTURE: CPT | Performed by: ORTHOPAEDIC SURGERY

## 2024-01-02 PROCEDURE — 250N000009 HC RX 250: Performed by: ORTHOPAEDIC SURGERY

## 2024-01-02 PROCEDURE — 27279 ARTHRD SI JT PLMT TARTCLR DV: CPT | Mod: LT | Performed by: ORTHOPAEDIC SURGERY

## 2024-01-02 PROCEDURE — 360N000086 HC SURGERY LEVEL 6 W/ FLUORO, PER MIN: Performed by: ORTHOPAEDIC SURGERY

## 2024-01-02 PROCEDURE — 710N000012 HC RECOVERY PHASE 2, PER MINUTE: Performed by: ORTHOPAEDIC SURGERY

## 2024-01-02 PROCEDURE — 250N000011 HC RX IP 250 OP 636: Performed by: ANESTHESIOLOGY

## 2024-01-02 PROCEDURE — 250N000013 HC RX MED GY IP 250 OP 250 PS 637: Performed by: PHYSICIAN ASSISTANT

## 2024-01-02 PROCEDURE — C1713 ANCHOR/SCREW BN/BN,TIS/BN: HCPCS | Performed by: ORTHOPAEDIC SURGERY

## 2024-01-02 PROCEDURE — 250N000013 HC RX MED GY IP 250 OP 250 PS 637: Performed by: ORTHOPAEDIC SURGERY

## 2024-01-02 PROCEDURE — 258N000003 HC RX IP 258 OP 636: Performed by: NURSE ANESTHETIST, CERTIFIED REGISTERED

## 2024-01-02 PROCEDURE — 250N000013 HC RX MED GY IP 250 OP 250 PS 637: Performed by: ANESTHESIOLOGY

## 2024-01-02 PROCEDURE — C1889 IMPLANT/INSERT DEVICE, NOC: HCPCS | Performed by: ORTHOPAEDIC SURGERY

## 2024-01-02 DEVICE — 10.0MM X 65MM  IFUSE-TORQ
Type: IMPLANTABLE DEVICE | Site: SACRUM | Status: FUNCTIONAL
Brand: IFUSE TORQ IMPLANT SYSTEM

## 2024-01-02 DEVICE — IMPLANTABLE DEVICE: Type: IMPLANTABLE DEVICE | Site: SACRUM | Status: FUNCTIONAL

## 2024-01-02 DEVICE — 10.0MM X 45MM  IFUSE-TORQ
Type: IMPLANTABLE DEVICE | Site: SACRUM | Status: FUNCTIONAL
Brand: IFUSE TORQ IMPLANT SYSTEM

## 2024-01-02 RX ORDER — ONDANSETRON 2 MG/ML
INJECTION INTRAMUSCULAR; INTRAVENOUS PRN
Status: DISCONTINUED | OUTPATIENT
Start: 2024-01-02 | End: 2024-01-02

## 2024-01-02 RX ORDER — METHOCARBAMOL 500 MG/1
500 TABLET, FILM COATED ORAL EVERY 6 HOURS PRN
Status: DISCONTINUED | OUTPATIENT
Start: 2024-01-02 | End: 2024-01-05 | Stop reason: HOSPADM

## 2024-01-02 RX ORDER — HYDROMORPHONE HYDROCHLORIDE 1 MG/ML
0.4 INJECTION, SOLUTION INTRAMUSCULAR; INTRAVENOUS; SUBCUTANEOUS EVERY 5 MIN PRN
Status: DISCONTINUED | OUTPATIENT
Start: 2024-01-02 | End: 2024-01-02 | Stop reason: HOSPADM

## 2024-01-02 RX ORDER — ONDANSETRON 2 MG/ML
4 INJECTION INTRAMUSCULAR; INTRAVENOUS EVERY 30 MIN PRN
Status: DISCONTINUED | OUTPATIENT
Start: 2024-01-02 | End: 2024-01-02 | Stop reason: HOSPADM

## 2024-01-02 RX ORDER — GABAPENTIN 100 MG/1
100 CAPSULE ORAL AT BEDTIME
Status: DISCONTINUED | OUTPATIENT
Start: 2024-01-02 | End: 2024-01-05 | Stop reason: HOSPADM

## 2024-01-02 RX ORDER — FENTANYL CITRATE 50 UG/ML
50 INJECTION, SOLUTION INTRAMUSCULAR; INTRAVENOUS EVERY 5 MIN PRN
Status: DISCONTINUED | OUTPATIENT
Start: 2024-01-02 | End: 2024-01-02 | Stop reason: HOSPADM

## 2024-01-02 RX ORDER — NALOXONE HYDROCHLORIDE 0.4 MG/ML
0.2 INJECTION, SOLUTION INTRAMUSCULAR; INTRAVENOUS; SUBCUTANEOUS
Status: DISCONTINUED | OUTPATIENT
Start: 2024-01-02 | End: 2024-01-05 | Stop reason: HOSPADM

## 2024-01-02 RX ORDER — PROPOFOL 10 MG/ML
INJECTION, EMULSION INTRAVENOUS PRN
Status: DISCONTINUED | OUTPATIENT
Start: 2024-01-02 | End: 2024-01-02

## 2024-01-02 RX ORDER — ONDANSETRON 4 MG/1
4 TABLET, ORALLY DISINTEGRATING ORAL EVERY 6 HOURS PRN
Status: DISCONTINUED | OUTPATIENT
Start: 2024-01-02 | End: 2024-01-05 | Stop reason: HOSPADM

## 2024-01-02 RX ORDER — ACETAMINOPHEN 325 MG/1
975 TABLET ORAL EVERY 8 HOURS
Status: COMPLETED | OUTPATIENT
Start: 2024-01-02 | End: 2024-01-05

## 2024-01-02 RX ORDER — BUPIVACAINE HYDROCHLORIDE AND EPINEPHRINE 2.5; 5 MG/ML; UG/ML
INJECTION, SOLUTION INFILTRATION; PERINEURAL PRN
Status: DISCONTINUED | OUTPATIENT
Start: 2024-01-02 | End: 2024-01-02 | Stop reason: HOSPADM

## 2024-01-02 RX ORDER — OXYCODONE HYDROCHLORIDE 5 MG/1
5-10 TABLET ORAL EVERY 4 HOURS PRN
Qty: 20 TABLET | Refills: 0 | Status: SHIPPED | OUTPATIENT
Start: 2024-01-02 | End: 2024-01-04

## 2024-01-02 RX ORDER — DEXAMETHASONE SODIUM PHOSPHATE 4 MG/ML
INJECTION, SOLUTION INTRA-ARTICULAR; INTRALESIONAL; INTRAMUSCULAR; INTRAVENOUS; SOFT TISSUE PRN
Status: DISCONTINUED | OUTPATIENT
Start: 2024-01-02 | End: 2024-01-02

## 2024-01-02 RX ORDER — SODIUM CHLORIDE, SODIUM LACTATE, POTASSIUM CHLORIDE, CALCIUM CHLORIDE 600; 310; 30; 20 MG/100ML; MG/100ML; MG/100ML; MG/100ML
INJECTION, SOLUTION INTRAVENOUS CONTINUOUS
Status: DISCONTINUED | OUTPATIENT
Start: 2024-01-02 | End: 2024-01-02 | Stop reason: HOSPADM

## 2024-01-02 RX ORDER — ONDANSETRON 4 MG/1
4 TABLET, ORALLY DISINTEGRATING ORAL EVERY 30 MIN PRN
Status: DISCONTINUED | OUTPATIENT
Start: 2024-01-02 | End: 2024-01-02 | Stop reason: HOSPADM

## 2024-01-02 RX ORDER — GABAPENTIN 100 MG/1
100 CAPSULE ORAL
Status: COMPLETED | OUTPATIENT
Start: 2024-01-02 | End: 2024-01-02

## 2024-01-02 RX ORDER — PROPOFOL 10 MG/ML
INJECTION, EMULSION INTRAVENOUS CONTINUOUS PRN
Status: DISCONTINUED | OUTPATIENT
Start: 2024-01-02 | End: 2024-01-02

## 2024-01-02 RX ORDER — MAGNESIUM HYDROXIDE 1200 MG/15ML
LIQUID ORAL PRN
Status: DISCONTINUED | OUTPATIENT
Start: 2024-01-02 | End: 2024-01-02 | Stop reason: HOSPADM

## 2024-01-02 RX ORDER — HYDROMORPHONE HYDROCHLORIDE 1 MG/ML
0.2 INJECTION, SOLUTION INTRAMUSCULAR; INTRAVENOUS; SUBCUTANEOUS EVERY 5 MIN PRN
Status: DISCONTINUED | OUTPATIENT
Start: 2024-01-02 | End: 2024-01-02 | Stop reason: HOSPADM

## 2024-01-02 RX ORDER — LIDOCAINE HYDROCHLORIDE 20 MG/ML
INJECTION, SOLUTION INFILTRATION; PERINEURAL PRN
Status: DISCONTINUED | OUTPATIENT
Start: 2024-01-02 | End: 2024-01-02

## 2024-01-02 RX ORDER — ACETAMINOPHEN 325 MG/1
650 TABLET ORAL EVERY 4 HOURS PRN
Status: DISCONTINUED | OUTPATIENT
Start: 2024-01-05 | End: 2024-01-05 | Stop reason: HOSPADM

## 2024-01-02 RX ORDER — KETOROLAC TROMETHAMINE 30 MG/ML
15 INJECTION, SOLUTION INTRAMUSCULAR; INTRAVENOUS
Status: COMPLETED | OUTPATIENT
Start: 2024-01-02 | End: 2024-01-02

## 2024-01-02 RX ORDER — BUPROPION HYDROCHLORIDE 150 MG/1
150 TABLET ORAL EVERY MORNING
Status: DISCONTINUED | OUTPATIENT
Start: 2024-01-03 | End: 2024-01-05 | Stop reason: HOSPADM

## 2024-01-02 RX ORDER — ACETAMINOPHEN 325 MG/1
975 TABLET ORAL ONCE
Status: DISCONTINUED | OUTPATIENT
Start: 2024-01-02 | End: 2024-01-02 | Stop reason: HOSPADM

## 2024-01-02 RX ORDER — HYDROMORPHONE HYDROCHLORIDE 1 MG/ML
0.2 INJECTION, SOLUTION INTRAMUSCULAR; INTRAVENOUS; SUBCUTANEOUS
Status: DISCONTINUED | OUTPATIENT
Start: 2024-01-02 | End: 2024-01-05 | Stop reason: HOSPADM

## 2024-01-02 RX ORDER — OXYCODONE HYDROCHLORIDE 10 MG/1
10 TABLET ORAL
Status: DISCONTINUED | OUTPATIENT
Start: 2024-01-02 | End: 2024-01-02 | Stop reason: HOSPADM

## 2024-01-02 RX ORDER — FENTANYL CITRATE 50 UG/ML
25 INJECTION, SOLUTION INTRAMUSCULAR; INTRAVENOUS EVERY 5 MIN PRN
Status: DISCONTINUED | OUTPATIENT
Start: 2024-01-02 | End: 2024-01-02 | Stop reason: HOSPADM

## 2024-01-02 RX ORDER — GABAPENTIN 100 MG/1
CAPSULE ORAL
Qty: 30 CAPSULE | Refills: 0 | Status: SHIPPED | OUTPATIENT
Start: 2024-01-02 | End: 2024-01-05

## 2024-01-02 RX ORDER — SODIUM CHLORIDE, SODIUM LACTATE, POTASSIUM CHLORIDE, CALCIUM CHLORIDE 600; 310; 30; 20 MG/100ML; MG/100ML; MG/100ML; MG/100ML
INJECTION, SOLUTION INTRAVENOUS CONTINUOUS PRN
Status: DISCONTINUED | OUTPATIENT
Start: 2024-01-02 | End: 2024-01-02

## 2024-01-02 RX ORDER — OXYCODONE HYDROCHLORIDE 5 MG/1
5 TABLET ORAL EVERY 4 HOURS PRN
Status: DISCONTINUED | OUTPATIENT
Start: 2024-01-02 | End: 2024-01-04

## 2024-01-02 RX ORDER — ACETAMINOPHEN 325 MG/1
650 TABLET ORAL EVERY 4 HOURS PRN
Qty: 50 TABLET | Refills: 0 | Status: SHIPPED | OUTPATIENT
Start: 2024-01-02 | End: 2024-01-05

## 2024-01-02 RX ORDER — NALOXONE HYDROCHLORIDE 0.4 MG/ML
0.4 INJECTION, SOLUTION INTRAMUSCULAR; INTRAVENOUS; SUBCUTANEOUS
Status: DISCONTINUED | OUTPATIENT
Start: 2024-01-02 | End: 2024-01-05 | Stop reason: HOSPADM

## 2024-01-02 RX ORDER — POLYETHYLENE GLYCOL 3350 17 G/17G
17 POWDER, FOR SOLUTION ORAL DAILY
Status: DISCONTINUED | OUTPATIENT
Start: 2024-01-03 | End: 2024-01-05 | Stop reason: HOSPADM

## 2024-01-02 RX ORDER — OXYCODONE HYDROCHLORIDE 5 MG/1
5 TABLET ORAL
Status: COMPLETED | OUTPATIENT
Start: 2024-01-02 | End: 2024-01-02

## 2024-01-02 RX ORDER — CEFAZOLIN SODIUM/WATER 2 G/20 ML
2 SYRINGE (ML) INTRAVENOUS
Status: COMPLETED | OUTPATIENT
Start: 2024-01-02 | End: 2024-01-02

## 2024-01-02 RX ORDER — AMOXICILLIN 250 MG
1 CAPSULE ORAL DAILY
Qty: 30 TABLET | Refills: 0 | Status: SHIPPED | OUTPATIENT
Start: 2024-01-02 | End: 2024-01-05

## 2024-01-02 RX ORDER — BISACODYL 10 MG
10 SUPPOSITORY, RECTAL RECTAL DAILY PRN
Status: DISCONTINUED | OUTPATIENT
Start: 2024-01-02 | End: 2024-01-05 | Stop reason: HOSPADM

## 2024-01-02 RX ORDER — CEFAZOLIN SODIUM/WATER 2 G/20 ML
2 SYRINGE (ML) INTRAVENOUS SEE ADMIN INSTRUCTIONS
Status: DISCONTINUED | OUTPATIENT
Start: 2024-01-02 | End: 2024-01-02 | Stop reason: HOSPADM

## 2024-01-02 RX ORDER — OXYCODONE HYDROCHLORIDE 10 MG/1
10 TABLET ORAL EVERY 4 HOURS PRN
Status: DISCONTINUED | OUTPATIENT
Start: 2024-01-02 | End: 2024-01-04

## 2024-01-02 RX ORDER — ONDANSETRON 2 MG/ML
4 INJECTION INTRAMUSCULAR; INTRAVENOUS EVERY 6 HOURS PRN
Status: DISCONTINUED | OUTPATIENT
Start: 2024-01-02 | End: 2024-01-05 | Stop reason: HOSPADM

## 2024-01-02 RX ORDER — AMOXICILLIN 250 MG
1 CAPSULE ORAL 2 TIMES DAILY
Status: DISCONTINUED | OUTPATIENT
Start: 2024-01-02 | End: 2024-01-05 | Stop reason: HOSPADM

## 2024-01-02 RX ORDER — HYDROMORPHONE HYDROCHLORIDE 1 MG/ML
0.4 INJECTION, SOLUTION INTRAMUSCULAR; INTRAVENOUS; SUBCUTANEOUS
Status: DISCONTINUED | OUTPATIENT
Start: 2024-01-02 | End: 2024-01-05 | Stop reason: HOSPADM

## 2024-01-02 RX ORDER — LIDOCAINE 40 MG/G
CREAM TOPICAL
Status: DISCONTINUED | OUTPATIENT
Start: 2024-01-02 | End: 2024-01-05 | Stop reason: HOSPADM

## 2024-01-02 RX ADMIN — Medication 50 MG: at 10:43

## 2024-01-02 RX ADMIN — PHENYLEPHRINE HYDROCHLORIDE 100 MCG: 10 INJECTION INTRAVENOUS at 11:04

## 2024-01-02 RX ADMIN — SENNOSIDES AND DOCUSATE SODIUM 1 TABLET: 50; 8.6 TABLET ORAL at 20:23

## 2024-01-02 RX ADMIN — KETOROLAC TROMETHAMINE 15 MG: 30 INJECTION, SOLUTION INTRAMUSCULAR; INTRAVENOUS at 12:53

## 2024-01-02 RX ADMIN — PROPOFOL 100 MCG/KG/MIN: 10 INJECTION, EMULSION INTRAVENOUS at 10:45

## 2024-01-02 RX ADMIN — ONDANSETRON 4 MG: 2 INJECTION INTRAMUSCULAR; INTRAVENOUS at 11:42

## 2024-01-02 RX ADMIN — ACETAMINOPHEN 975 MG: 325 TABLET, FILM COATED ORAL at 14:39

## 2024-01-02 RX ADMIN — HYDROMORPHONE HYDROCHLORIDE 0.25 MG: 1 INJECTION, SOLUTION INTRAMUSCULAR; INTRAVENOUS; SUBCUTANEOUS at 11:27

## 2024-01-02 RX ADMIN — HYDROMORPHONE HYDROCHLORIDE 0.25 MG: 1 INJECTION, SOLUTION INTRAMUSCULAR; INTRAVENOUS; SUBCUTANEOUS at 11:40

## 2024-01-02 RX ADMIN — PHENYLEPHRINE HYDROCHLORIDE 100 MCG: 10 INJECTION INTRAVENOUS at 11:55

## 2024-01-02 RX ADMIN — PROPOFOL 30 MG: 10 INJECTION, EMULSION INTRAVENOUS at 11:44

## 2024-01-02 RX ADMIN — ACETAMINOPHEN 975 MG: 325 TABLET, FILM COATED ORAL at 22:10

## 2024-01-02 RX ADMIN — SUGAMMADEX 120 MG: 100 INJECTION, SOLUTION INTRAVENOUS at 11:53

## 2024-01-02 RX ADMIN — PHENYLEPHRINE HYDROCHLORIDE 100 MCG: 10 INJECTION INTRAVENOUS at 11:58

## 2024-01-02 RX ADMIN — Medication 2 G: at 10:47

## 2024-01-02 RX ADMIN — PHENYLEPHRINE HYDROCHLORIDE 100 MCG: 10 INJECTION INTRAVENOUS at 11:48

## 2024-01-02 RX ADMIN — DEXAMETHASONE SODIUM PHOSPHATE 4 MG: 4 INJECTION, SOLUTION INTRA-ARTICULAR; INTRALESIONAL; INTRAMUSCULAR; INTRAVENOUS; SOFT TISSUE at 10:43

## 2024-01-02 RX ADMIN — FENTANYL CITRATE 25 MCG: 50 INJECTION INTRAMUSCULAR; INTRAVENOUS at 12:36

## 2024-01-02 RX ADMIN — PROPOFOL 120 MG: 10 INJECTION, EMULSION INTRAVENOUS at 10:42

## 2024-01-02 RX ADMIN — OXYCODONE HYDROCHLORIDE 5 MG: 5 TABLET ORAL at 20:22

## 2024-01-02 RX ADMIN — SODIUM CHLORIDE, POTASSIUM CHLORIDE, SODIUM LACTATE AND CALCIUM CHLORIDE: 600; 310; 30; 20 INJECTION, SOLUTION INTRAVENOUS at 10:40

## 2024-01-02 RX ADMIN — GABAPENTIN 100 MG: 100 CAPSULE ORAL at 22:10

## 2024-01-02 RX ADMIN — FENTANYL CITRATE 25 MCG: 50 INJECTION INTRAMUSCULAR; INTRAVENOUS at 12:26

## 2024-01-02 RX ADMIN — LIDOCAINE HYDROCHLORIDE 70 MG: 20 INJECTION, SOLUTION INFILTRATION; PERINEURAL at 10:42

## 2024-01-02 RX ADMIN — OXYCODONE HYDROCHLORIDE 5 MG: 5 TABLET ORAL at 12:56

## 2024-01-02 RX ADMIN — GABAPENTIN 100 MG: 100 CAPSULE ORAL at 09:36

## 2024-01-02 ASSESSMENT — ACTIVITIES OF DAILY LIVING (ADL)
ADLS_ACUITY_SCORE: 34
ADLS_ACUITY_SCORE: 32
ADLS_ACUITY_SCORE: 34
ADLS_ACUITY_SCORE: 32
ADLS_ACUITY_SCORE: 34
ADLS_ACUITY_SCORE: 31
ADLS_ACUITY_SCORE: 31
ADLS_ACUITY_SCORE: 32

## 2024-01-02 NOTE — BRIEF OP NOTE
[facility]    Brief Operative Note    Pre-operative diagnosis: Sacral insufficiency fracture, with nonunion, subsequent encounter [M84.48XK]  Post-operative diagnosis Same as pre-operative diagnosis    Procedure: Percutaneous skeletal fixation of posterior pelvic bone fracture, Left - Sacrum    Surgeon: Surgeon(s) and Role:     * Jones Hagen MD - Primary     * Jorge Wade MD - Assisting  Anesthesia: General   Estimated Blood Loss: 10 mL     Drains: None  Specimens: * No specimens in log *  Findings:   None.  Complications: None.  Implants:   Implant Name Type Inv. Item Serial No.  Lot No. LRB No. Used Action   IMP SPINAL FX IFUSE TORQ [id number] STRL LF [id number] - [id number] Metallic Hardware/[place] IMP SPINAL FX IFUSE TORQ [id number] STRL LF [id number]  [employer] [id number] Left 1 Implanted   IMP SPINAL IFUSE TORQ [id number] STRL LF [id number] - [id number] Metallic Hardware/[place] IMP SPINAL IFUSE TORQ [id number] STRL LF [id number]  [employer] [id number] Left 1 Implanted   IMP SCR CAN [id number] W/32MM THRD .453 - [id number] Metallic Hardware/[place] IMP SCR CAN [id number] W/32MM THRD .453  [employer]  Left 1 Implanted

## 2024-01-02 NOTE — DISCHARGE INSTRUCTIONS
Same-Day Surgery   Adult Discharge Orders & Instructions     For 24 hours after surgery:  Get plenty of rest.  A responsible adult must stay with you for at least 24 hours after you leave the hospital.   Pain medication can slow your reflexes. Do not drive or use heavy equipment.  If you have weakness or tingling, don't drive or use heavy equipment until this feeling goes away.  Mixing alcohol and pain medication can cause dizziness and slow your breathing. It can even be fatal. Do not drink alcohol while taking pain medication.  Avoid strenuous or risky activities.  Ask for help when climbing stairs.   You may feel lightheaded.  If so, sit for a few minutes before standing.  Have someone help you get up.   If you have nausea (feel sick to your stomach), drink only clear liquids such as apple juice, ginger ale, broth or 7-Up.  Rest may also help.  Be sure to drink enough fluids.  Move to a regular diet as you feel able. Take pain medications with a small amount of solid food, such as toast or crackers, to avoid nausea.   A slight fever is normal. Call the doctor if your fever is over 100 F (37.7 C) (taken under the tongue) or lasts longer than 24 hours.  You may have a dry mouth, muscle aches, trouble sleeping or a sore throat.  These symptoms should go away after 24 hours.  Do not make important or legal decisions.   Pain Management:      1. Take pain medication (if prescribed) for pain as directed by your physician.        2. WARNING: If the pain medication you have been prescribed contains Tylenol  (acetaminophen), DO NOT take additional doses of Tylenol (acetaminophen).     Call your doctor for any of the followin.  Signs of infection (fever, growing tenderness at the surgery site, severe pain, a large amount of drainage or bleeding, foul-smelling drainage, redness, swelling).    2.  It has been over 8 to 10 hours since surgery and you are still not able to urinate (pee).    3.  Headache for over 24  hours.    4.  Numbness, tingling or weakness the day after surgery (if you had spinal anesthesia).  To contact a doctor, call Dr. Hagen, [facility] [phone] or:  '   [phone] and ask for the Resident On Call for: Orthopedics (answered 24 hours a day)  '   Emergency Department:  [facility]: [phone]  [facility]: [phone]               Rev. 10/2014      You can take ibuprofen at or after 7pm.   You can take tylenol again at 8:40pm - take 650mg-1,000mg. Do not exceed 4,000mg in one day. (You took 1000mg at home + 975mg at the hospital = 1975 mg so far today)

## 2024-01-02 NOTE — CONSULTS
[facility]  Consult Note - Hospitalist Service  Date of Admission:  1/2/2024  Consult Requested by: Dr. Hagen  Reason for Consult: Postoperative medical management.     Assessment & Plan   Garth Montgomery is a 78 year old female with a history of sacral insufficiency fracture, dyslipidemia, depression, and anxiety who was admitted following percutaneous fixation of pelvic fracture and left sacroiliac joint fusion with Dr. Hagen on 1/2/24. There were no immediate postoperative complications. EBL 20 ml.     Sacral Insufficiency Fracture  S/p percutaneous fixation of pelvic fracture and left sacroiliac joint fusion with Dr. Hagen on 1/2/24. EBL 20 ml.   - Orthopedic surgery primary  - Pain regimen: Scheduled and PRN Tylenol, Scheduled gabapentin, PRN oxycodone, Dilaudid, Robaxin  - Bowel regimen   - VTE ppx: per orthopedics   - Touch weightbearing 20-40 lbs LLE x 3 weeks  - PT/OT    Depression  Anxiety  - Continue PTA Wellbutrin 150 mg daily. Was only taking PRN prior to admission. Encourage follow up with PCP to discuss medication.     Osteoporosis  - Forteo injection due 1/11/24  - Resume calcium and vit D supplements upon discharge      Vitamin Deficiencies  - Patient would like to hold all vitamins during admission including Vit D, C, B1, calcium. Plans to resume upon discharge.        The patient's care was discussed with the Patient.    Clinically Significant Risk Factors Present on Admission                                  Bess Clark PA-C  Hospitalist Service  Securely message with [employer] (more info)  Text page via [facility] Paging/Directory   ______________________________________________________________________    Chief Complaint   S/p percutaneous fixation of pelvic fracture, SI joint fusion.     History is obtained from the patient and EHR review.     History of Present Illness   Garth Montgomery is a 78 year old female with a history of sacral insufficiency fracture,  dyslipidemia, depression, and anxiety who was admitted following percutaneous fixation of pelvic fracture and left sacroiliac joint fusion with Dr. Hagen on 1/2/24. There were no immediate postoperative complications. EBL 20 ml.     Preoperative H&P reviewed. Chronic conditions including depression and anxiety well controlled.    Postoperatively, patient reports pain is well controlled. No acute medical concerns.       Past Medical History    History reviewed. No pertinent past medical history.    Past Surgical History   Past Surgical History:   Procedure Laterality Date    HYSTERECTOMY      ORTHOPEDIC SURGERY      foot surgery    TKA Right        Medications   I have reviewed this patient's current medications       Review of Systems    The 10 point Review of Systems is negative other than noted in the HPI or here.      Physical Exam   Vital Signs: Temp: 97.6  F (36.4  C) Temp src: Oral BP: 135/82 Pulse: 92   Resp: 16 (Simultaneous filing. User may not have seen previous data.) SpO2: 95 % (Simultaneous filing. User may not have seen previous data.) O2 Device: None (Room air) (Simultaneous filing. User may not have seen previous data.) Oxygen Delivery: 10 LPM  Weight: 128 lbs 4.92 oz    General Appearance: Alert, sitting in bed, appears comfortable.   Respiratory: Clear bilaterally.   Cardiovascular: RRR, no murmur, no peripheral edema.   GI: Soft, Nondistended.   MSK: No bony deformity.    Skin: Warm, dry, no rash.   Neuro: No focal deficits, moves all extremities.   Psych: Calm, pleasant, normal affect.      Medical Decision Making       60 MINUTES SPENT BY ME on the date of service doing chart review, history, exam, documentation & further activities per the note.      Data

## 2024-01-02 NOTE — ANESTHESIA POSTPROCEDURE EVALUATION
Patient: Garth Montgomery    Procedure: Procedure(s):  Percutaneous skeletal fixation of posterior pelvic bone fracture       Anesthesia Type:  General    Note:  Disposition: Outpatient   Postop Pain Control: Uneventful            Sign Out: Well controlled pain   PONV: No   Neuro/Psych: Uneventful            Sign Out: Acceptable/Baseline neuro status   Airway/Respiratory: Uneventful            Sign Out: Acceptable/Baseline resp. status   CV/Hemodynamics: Uneventful            Sign Out: Acceptable CV status; No obvious hypovolemia; No obvious fluid overload   Other NRE: NONE   DID A NON-ROUTINE EVENT OCCUR? No           Last vitals:  Vitals Value Taken Time   /89 01/02/24 1315   Temp 36.4  C (97.5  F) 01/02/24 1214   Pulse 90 01/02/24 1317   Resp 10 01/02/24 1317   SpO2 98 % 01/02/24 1317   Vitals shown include unfiled device data.    Electronically Signed By: Luciano Pena MD, MD  January 2, 2024  1:18 PM

## 2024-01-02 NOTE — LETTER
Recipient: [facility]          Sender: Angy PradoKATHLEEN at [facility] [phone]          Date: January 5, 2024  Patient Name:  Garth Montgomery  Patient YOB: 1945  Routing Message: PT/OT orders should be attached. Let me know if you are still having issues. Thanks!        The documents accompanying this notice contain confidential information belonging to the sender.  This information is intended only for the use of the individual or entity named above.  The authorized recipient of this information is prohibited from disclosing this information to any other party and is required to destroy the information after its stated need has been fulfilled, unless otherwise required by state law.    If you are not the intended recipient, you are hereby notified that any disclosure, copy, distribution or action taken in reliance on the contents of these documents is strictly prohibited.  If you have received this document in error, please return it by fax to [phone] with a note on the cover sheet explaining why you are returning it (e.g. not your patient, not your provider, etc.).  If you need further assistance, please call .  Documents may also be returned by mail to [employer], , [address], [place], [place] [address].

## 2024-01-02 NOTE — PHARMACY-ADMISSION MEDICATION HISTORY
Pharmacist Admission Medication History    Admission medication history is complete. The information provided in this note is only as accurate as the sources available at the time of the update.    Information Source(s): Patient and CareEverywhere/SureScripts via phone    Pertinent Information:   -Confirmed in Care Everywhere patient last received Evenity injection on 12/14/23 in clinic, next due 1/11/24. Never started Forteo, patient did not want to give herself injections.  -Patient reports stopping bupropion on her own about one month ago. She didn't realize it was something she was supposed to take daily for depression so was only taking it as needed. She wants to start taking it again.    Changes made to PTA medication list:  Added: Evenity injection  Deleted: None  Changed: tramadol 50 mg q6 prn --> 50 mg q8 prn    Allergies reviewed with patient and updates made in EHR: yes    Medication History Completed By: Jana Baugh [facility] 1/2/2024 5:38 PM    Prior to Admission medications    Medication Sig Last Dose Taking? Auth Provider Long Term End Date   acetaminophen (TYLENOL) 650 MG CR tablet Take 1,300 mg by mouth every 8 hours as needed 1/2/2024 Yes Reported, Patient     buPROPion (WELLBUTRIN XL) 150 MG 24 hr tablet Take 150 mg by mouth every morning Past Month Yes Reported, Patient Yes 8/8/24   calcium citrate-vitamin D (CALCIUM CITRATE-VITAMIN D3) 315-6.25 MG-MCG TABS per tablet Take 1 tablet by mouth every morning 12/26/2023 Yes Reported, Patient     Cranberry (RA CRANBERRY) 500 MG CAPS Take 500 mg by mouth every morning 12/26/2023 Yes Reported, Patient     glucosamine-chondroitin 500-400 MG CAPS per capsule Take 1 capsule by mouth every morning 12/26/2023 Yes Reported, Patient     Magnesium Citrate 100 MG CAPS Take 1 capsule by mouth every morning 12/26/2023 Yes Reported, Patient     multivitamin w/minerals (MULTI-VITAMIN) tablet Take 1 tablet by mouth every morning 12/26/2023 Yes Reported, Patient      potassium 99 MG TABS Take 1 tablet by mouth every morning 12/26/2023 Yes Reported, Patient     romosozumab-aqqg (EVENITY) 105 MG/1.17ML SOSY injection Inject 210 mg Subcutaneous every 28 days 12/14/2023 Yes Unknown, Entered By History     traMADol (ULTRAM) 50 MG tablet Take 50 mg by mouth every 8 hours as needed for pain 1/2/2024 Yes Reported, Patient     vitamin B1 (THIAMINE) 50 MG tablet Take 100 mg by mouth every morning 12/26/2023 Yes Reported, Patient     vitamin C (ASCORBIC ACID) 500 MG tablet Take 500 mg by mouth every morning 12/26/2023 Yes Reported, Patient     Vitamin D, Cholecalciferol, 10 MCG (400 UNIT) CHEW Take by mouth every morning 12/26/2023 Yes Reported, Patient     teriparatide, recombinant, (FORTEO) 600 MCG/2.4ML SOPN injection Inject 0.08 mLs (20 mcg) Subcutaneous daily for 30 days  Patient not taking: Reported on 1/2/2024 Not Taking  Brittney Mccollum MD Yes 1/5/24

## 2024-01-02 NOTE — LETTER
Recipient: [facility]          Sender: KATHLEEN Santana at [facility] [phone]          Date: January 5, 2024  Patient Name:  Garth Montgomery  Patient YOB: 1945  Routing Message: Attached are discharge orders. PAS completed- [id number]. Patient will be arriving around 12pm. Hard script was sent with patient, therefore, I cannot hand fax that over (sorry!). Please call me with any questions or concerns. Thank you so much!         The documents accompanying this notice contain confidential information belonging to the sender.  This information is intended only for the use of the individual or entity named above.  The authorized recipient of this information is prohibited from disclosing this information to any other party and is required to destroy the information after its stated need has been fulfilled, unless otherwise required by state law.    If you are not the intended recipient, you are hereby notified that any disclosure, copy, distribution or action taken in reliance on the contents of these documents is strictly prohibited.  If you have received this document in error, please return it by fax to [phone] with a note on the cover sheet explaining why you are returning it (e.g. not your patient, not your provider, etc.).  If you need further assistance, please call .  Documents may also be returned by mail to [employer], , [address], [place], [place] [address].

## 2024-01-02 NOTE — TELEPHONE ENCOUNTER
Daughter-in-law, Melissa, calling to ask if patient will be admitted overnight following surgery. Verbal consent to communicate obtained from patient. Advised speaking with patient's nurse at the hospital. Caller will have patient discuss with her nurse.    Mago Morin RN  01/02/24 4:03 PM  [facility] Nurse Advisor

## 2024-01-02 NOTE — OP NOTE
Preoperative diagnosis: Sacral insufficiency fracture    Postoperative diagnosis: Sacral insufficiency fracture    Operative procedure:  1)  Percutaneous fixation of pelvic fracture  2) Minimally invasive sacroiliac joint fusion left  3) Image guided surgery    Surgeon: Jones Hagen MD.    Assistant: Rangel Wade MD.  He was necessary for assistance with positioning instrumentation and closure.  No qualified resident was available.    Indication for operation: Patient is a 78-year-old female who sustained an low-energy pelvic insufficiency fracture.  She was evaluated in [place] and also found to have a right-sided L5 pedicle fracture.  She also has a modest scoliotic deformity.  Because of this Dr. Aleman from [place] referred her for evaluation and treatment recognizing that she might need spinal pelvic fixation rather than just pelvic fixation alone.  I saw and evaluated the patient.  I agree with the diagnosis of a sacral insufficiency fracture which was confirmed by CT and MRI.  She had failed to make improvement and had significant pain and marked activity limitations.  I recommended instrumentation and fixation.  Originally this was categorized as a minimally invasive SI joint fusion erroneously.  This was denied by insurance.  I pointed out that this was in fact a sacral insufficiency fracture with ongoing pain unresponsive to nonoperative management and subsequently the intervention was then approved.    The OR team was briefed about the plan and the patient was brought to the operating room and underwent the induction of adequate general anesthesia.  She was turned and positioned prone on the Trios table fourposter frame.  She was then prepared and draped in usual sterile fashion.  A timeout was done confirming the site and type of surgery.  She did receive prophylactic antibiotics.    Marcaine 0.25% with epinephrine was injected over the right posterior superior iliac spine and a short percutaneous  reference pin was seated into place.  The O-arm was brought in and intraoperative 3D images were obtained and transferred to the Stealth image guided workstation.  This was now used to valentino the entry points for an S2 through and through iliosacral screw along with an S1 set of screws.  She has a dysmorphic sacrum and did not have an accessible quarter at S1 for a through and through screw.    The subcutaneous tissue over the proposed incision was injected with Marcaine.  The skin was incised.  A navigated K wire was then delivered into the S1 dysmorphic segment.  This was confirmed with fluoroscopy.  This was drilled and then a 10.0 x 65 mm iFuse torque implant was seated into place and checked under fluoroscopy.    Attention was now addressed to the S2 segment.  A trajectory was established for a through and through iliac-sacral-iliac screw.  Navigated drill guide was used to deliver a K wire.  This was checked under fluoroscopy.  A 6.5 x 160 mm fully threaded titanium Synthes screw was then seated into place and checked under fluoroscopy.    Next attention was addressed between the S1 and S2 segments.  It appeared that we could place a second iFuse torque implant.  A navigated K wire was delivered and checked under fluoroscopy.  The soft tissue was dilated this was drilled and then a 10.0 x 45 mm iFuse torque implant was seated into place.    2D and 3D imaging was then obtained confirming optimal positioning of the implants.  The wounds were irrigated out and closed in layers Dermabond was used on the skin.                Estimated blood loss was 20 cc and we utilized a total of 60 cc of Marcaine 0.25% with epinephrine.    I attempted to contact her son several times but he was not in the waiting room and I was not able to reach him on his cell phone.  I was however able to see the patient in the recovery room and go over with her what the plan is.  The instrumentation went exactly as planned.  She will be touch  weightbearing, 20 to 40 pounds on the left lower extremity, for 3 weeks.  At that point she should be seen by physical therapy to see if she is able to progress to full weightbearing.  She is currently using a rolling walker and this will be appropriate.    We will assess her mobility status and depending on how she is doing she may need to remain in observation status overnight tonight.    Jones Hagen MD

## 2024-01-02 NOTE — PROGRESS NOTES
Patient admitted to room 518 at approximately 1630 via W/C from OR. Patient was accompanied by other:Son.     Verbal SBAR report received from OR RN prior to patient arrival.     Patient self transferred to bed with SBA. Patient alert and oriented X 4. Denies pain during admission, pain  is controlled with current analgesics that was given at OR prior to transfer. VSS see flowsheet for detailed information.  Patient was oriented to plan of care, call light, bed controls, tv, telephone, bathroom, and visiting hours.

## 2024-01-02 NOTE — ANESTHESIA PREPROCEDURE EVALUATION
"Anesthesia Pre-Procedure Evaluation    Patient: Garth Montgomery   MRN: [id number] : 1945        Procedure : Procedure(s):  Percutaneous skeletal fixation of posterior pelvic bone fracture          No past medical history on file.   Past Surgical History:   Procedure Laterality Date    HYSTERECTOMY      ORTHOPEDIC SURGERY      foot surgery    TKA Right       No Known Allergies   Social History     Tobacco Use    Smoking status: Never     Passive exposure: Never    Smokeless tobacco: Never   Substance Use Topics    Alcohol use: Not Currently      Wt Readings from Last 1 Encounters:   24 58.2 kg (128 lb 4.9 oz)        Anesthesia Evaluation   Pt has had prior anesthetic. Type: General.    No history of anesthetic complications       ROS/MED HX  ENT/Pulmonary:  - neg pulmonary ROS     Neurologic:  - neg neurologic ROS     Cardiovascular:  - neg cardiovascular ROS     METS/Exercise Tolerance: >4 METS    Hematologic:  - neg hematologic  ROS     Musculoskeletal:  - neg musculoskeletal ROS     GI/Hepatic:  - neg GI/hepatic ROS     Renal/Genitourinary:  - neg Renal ROS     Endo:  - neg endo ROS     Psychiatric/Substance Use:  - neg psychiatric ROS     Infectious Disease:  - neg infectious disease ROS     Malignancy:  - neg malignancy ROS     Other:  - neg other ROS          Physical Exam    Airway  airway exam normal           Respiratory Devices and Support         Dental       (+) Minor Abnormalities - some fillings, tiny chips and Removable bridges or other hardware      Cardiovascular   cardiovascular exam normal          Pulmonary   pulmonary exam normal                OUTSIDE LABS:  CBC: No results found for: \"WBC\", \"HGB\", \"HCT\", \"PLT\"  BMP: No results found for: \"NA\", \"POTASSIUM\", \"CHLORIDE\", \"CO2\", \"BUN\", \"CR\", \"GLC\"  COAGS: No results found for: \"PTT\", \"INR\", \"FIBR\"  POC: No results found for: \"BGM\", \"HCG\", \"HCGS\"  HEPATIC: No results found for: \"ALBUMIN\", \"PROTTOTAL\", \"ALT\", \"AST\", \"GGT\", \"ALKPHOS\", " "\"BILITOTAL\", \"BILIDIRECT\", \"ANA ROSA\"  OTHER: No results found for: \"PH\", \"LACT\", \"A1C\", \"GABINO\", \"PHOS\", \"MAG\", \"LIPASE\", \"AMYLASE\", \"TSH\", \"T4\", \"T3\", \"CRP\", \"SED\"    Anesthesia Plan    ASA Status:  1    NPO Status:  NPO Appropriate    Anesthesia Type: General.     - Airway: ETT   Induction: Intravenous.   Maintenance: TIVA.        Consents    Anesthesia Plan(s) and associated risks, benefits, and realistic alternatives discussed. Questions answered and patient/representative(s) expressed understanding.     - Discussed: Risks, Benefits and Alternatives for BOTH SEDATION and the PROCEDURE were discussed     - Discussed with:  Patient            Postoperative Care    Pain management: Oral pain medications, Multi-modal analgesia.   PONV prophylaxis: Ondansetron (or other 5HT-3), Dexamethasone or Solumedrol     Comments:               Luciano Pena MD, MD    I have reviewed the pertinent notes and labs in the chart from the past 30 days and (re)examined the patient.  Any updates or changes from those notes are reflected in this note.                  "

## 2024-01-02 NOTE — LETTER
Transition Communication Hand-off for Care Transitions to Next Level of Care Provider    Name: Garth Montgomery  : 1945  MRN #: [id number]  Primary Care Provider: Lizzie Ruelas     Primary Clinic: [address] [place] [address] [place] [address]     Reason for Hospitalization:  Sacral insufficiency fracture, with nonunion, subsequent encounter [M84.48XK]  Closed pelvic fracture (H) [S32.9XXA]  Admit Date/Time: 2024  8:05 AM  Discharge Date: 24  Payor Source: Payor: [facility] / Plan: [facility] MEDICARE ADVANTAGE / Product Type: Medicare /     Discharge Plan: Discharge to [facility] in [place], [place]  Concern for non-adherence with plan of care: No     Discharge Needs Assessment:  Needs      Flowsheet Row Most Recent Value   Anticipated Changes Related to Illness none   Equipment Currently Used at Home walker, rolling, cane, straight, shower chair, grab bar, tub/shower   # of Referrals Placed by CM External Care Coordination          Already enrolled in Tele-monitoring program and name of program: Unknown  Follow-up specialty is recommended: Yes      Follow-up plan:    Future Appointments   Date Time Provider Department Center   2024 10:45 AM Aissatou Lackey OT [facility]   2024  3:30 PM Bess Overton, PT [facility]   2024  1:30 PM Jacquelin Melendrez, PA-C [facility]   3/21/2024 10:30 AM Jones Hagen MD [facility]     Any outstanding tests or procedures:  No      KATHLEEN Turcios, LGSW  5 Med Surg and 10 ICU   [facility]  Phone: [phone]  Pager: [phone]    AVS/Discharge Summary is the source of truth; this is a helpful guide for improved communication of patient story

## 2024-01-02 NOTE — LETTER
Recipient: [facility]          Sender: Angy Prado KATHLEEN at [facility] [phone]          Date: January 4, 2024  Patient Name:  Garth Montgomery  Patient YOB: 1945  Routing Message: Please review for TCU placement. Patient is medically ready to discharge at any time. Call with any questions or concerns. Thank you! [phone]        The documents accompanying this notice contain confidential information belonging to the sender.  This information is intended only for the use of the individual or entity named above.  The authorized recipient of this information is prohibited from disclosing this information to any other party and is required to destroy the information after its stated need has been fulfilled, unless otherwise required by state law.    If you are not the intended recipient, you are hereby notified that any disclosure, copy, distribution or action taken in reliance on the contents of these documents is strictly prohibited.  If you have received this document in error, please return it by fax to [phone] with a note on the cover sheet explaining why you are returning it (e.g. not your patient, not your provider, etc.).  If you need further assistance, please call .  Documents may also be returned by mail to [employer], , [address], [place], [place] [address].

## 2024-01-02 NOTE — ANESTHESIA PROCEDURE NOTES
Airway       Patient location during procedure: OR       Procedure Start/Stop Times: 1/2/2024 10:45 AM  Staff -        CRNA: Ariana Davila APRN CRNA       Performed By: CRNAIndications and Patient Condition       Indications for airway management: mone-procedural       Induction type:intravenous       Mask difficulty assessment: 1 - vent by mask    Final Airway Details       Final airway type: endotracheal airway       Successful airway: ETT - single  Endotracheal Airway Details        ETT size (mm): 7.0       Cuffed: yes       Successful intubation technique: direct laryngoscopy       DL Blade Type: Bonner 2       Grade View of Cords: 1       Adjucts: stylet       Position: Right       Measured from: gums/teeth       Secured at (cm): 21       Bite block used: Soft    Post intubation assessment        Placement verified by: capnometry, equal breath sounds and chest rise        Number of attempts at approach: 1       Secured with: tape       Ease of procedure: easy       Dentition: Intact and Unchanged    Medication(s) Administered   Medication Administration Time: 1/2/2024 10:45 AM

## 2024-01-02 NOTE — LETTER
[facility]          Sender: KATHLEEN Santana at [facility] [phone]          Date: January 4, 2024  Patient Name:  Garth Montgomery  Patient YOB: 1945  Routing Message: Please review for TCU placement. Patient is medically ready to discharge at any time. Call with any questions or concerns. Thank you! [phone]        The documents accompanying this notice contain confidential information belonging to the sender.  This information is intended only for the use of the individual or entity named above.  The authorized recipient of this information is prohibited from disclosing this information to any other party and is required to destroy the information after its stated need has been fulfilled, unless otherwise required by state law.    If you are not the intended recipient, you are hereby notified that any disclosure, copy, distribution or action taken in reliance on the contents of these documents is strictly prohibited.  If you have received this document in error, please return it by fax to [phone] with a note on the cover sheet explaining why you are returning it (e.g. not your patient, not your provider, etc.).  If you need further assistance, please call .  Documents may also be returned by mail to [employer], , [address], [place], [place] [address].

## 2024-01-02 NOTE — ANESTHESIA CARE TRANSFER NOTE
Patient: Garth Montgomery    Procedure: Procedure(s):  Percutaneous skeletal fixation of posterior pelvic bone fracture       Diagnosis: Sacral insufficiency fracture, with nonunion, subsequent encounter [M84.48XK]  Diagnosis Additional Information: No value filed.    Anesthesia Type:   General     Note:    Oropharynx: oropharynx clear of all foreign objects and spontaneously breathing  Level of Consciousness: awake  Oxygen Supplementation: room air    Independent Airway: airway patency satisfactory and stable  Dentition: dentition unchanged  Vital Signs Stable: post-procedure vital signs reviewed and stable  Report to RN Given: handoff report given  Patient transferred to: PACU    Handoff Report: Identifed the Patient, Identified the Reponsible Provider, Reviewed the pertinent medical history, Discussed the surgical course, Reviewed Intra-OP anesthesia mangement and issues during anesthesia, Set expectations for post-procedure period and Allowed opportunity for questions and acknowledgement of understanding      Vitals:  Vitals Value Taken Time   /86 01/02/24 1216   Temp 36.4 01/02/24 1216   Pulse 92 01/02/24 1216   Resp 22 01/02/24 1216   SpO2 99 % 01/02/24 1216   Vitals shown include unfiled device data.    Electronically Signed By: ROGER Plunkett CRNA  January 2, 2024  12:18 PM

## 2024-01-02 NOTE — OR NURSING
"PACU to Inpatient Nursing Handoff    Patient Garth Montgomery is a 78 year old female who speaks English.   Procedure Procedure(s):  Percutaneous skeletal fixation of posterior pelvic bone fracture   Surgeon(s) Primary: Jones Hagen MD  Assisting: Jorge Wade MD     No Known Allergies    Isolation  None    Past Medical History   has no past medical history on file.    Anesthesia General   Dermatome Level     Preop Meds gabapentin (Neurontin) - time given: 0936   Nerve block Not applicable   Intraop Meds dexamethasone (Decadron)  hydromorphone (Dilaudid): 0.5 mg total  ondansetron (Zofran): last given at 1142   Local Meds Yes   Antibiotics cefazolin (Ancef) - last given at 1047     Pain Patient Currently in Pain: yes   PACU meds  fentanyl (Sublimaze): 50 mcg (total dose) last given at 1236   ketorolac (Toradol): 15 mg last given at 1253   PCA / epidural No   Capnography     Telemetry ECG Rhythm: Normal sinus rhythm   Inpatient Telemetry Monitor Ordered? No        Labs Glucose No results found for: \"GLC\"    Hgb No results found for: \"HGB\"    INR No results found for: \"INR\"   PACU Imaging Not applicable     Wound/Incision Incision/Surgical Site 01/02/24 Left;Lateral Hip (Active)   Incision Assessment UTV 01/02/24 1354   Dressing Hydrocolloid 01/02/24 1214   Closure ENRIQUE 01/02/24 1354   Incision Drainage Amount None 01/02/24 1354   Dressing Intervention Clean, dry, intact 01/02/24 1354   Number of days: 0       Incision/Surgical Site 01/02/24 Posterior;Lower Back (Active)   Incision Assessment UTV 01/02/24 1354   Dressing Dry gauze 01/02/24 1214   Closure ENRIQUE 01/02/24 1354   Incision Drainage Amount None 01/02/24 1354   Drainage Description Serosanguinous 01/02/24 1330   Dressing Intervention Dried drainage 01/02/24 1354   Number of days: 0      CMS        Equipment ice pack   Other LDA       IV Access Peripheral IV 01/02/24 Right Hand (Active)   Site Assessment WDL 01/02/24 1354   Line Status Saline locked " 01/02/24 1354   Dressing Transparent 01/02/24 1354   Dressing Status clean;dry;intact 01/02/24 1354   Dressing Intervention New dressing  01/02/24 1000   Line Intervention Flushed 01/02/24 1000   Phlebitis Scale 0-->no symptoms 01/02/24 1354   Infiltration? no 01/02/24 1354   Number of days: 0      Blood Products Not applicable EBL minimal mL   Intake/Output Date 01/02/24 0700 - 01/03/24 0659   Shift 5644-5292 5633-7587 4701-9299 24 Hour Total   INTAKE   P.O. 100   100   I.V. 836   836   Shift Total(mL/kg) 936(16.08)   936(16.08)   OUTPUT   Urine 400   400   Shift Total(mL/kg) 400(6.87)   400(6.87)   Weight (kg) 58.2 58.2 58.2 58.2      Drains / Angel     Time of void PreOp Time of Void Prior to Procedure: 0700 (01/02/24 0929)    PostOp      Diapered? No   Bladder Scan Bladder Scan Volume (mL): 163 ml (attempted and unable to voidat this time) (01/02/24 1554)    mL (water) (01/02/24 1300)  crackers and water     Vitals    B/P: (!) 144/93  T: 98.6  F (37  C)    Temp src: Axillary  P:  Pulse: 100 (01/02/24 1530)          R: 14  O2:  SpO2: 94 %    O2 Device: None (Room air) (01/02/24 1500)    Oxygen Delivery: 10 LPM (01/02/24 1214)         Family/support present son   Patient belongings     Patient transported on wheelchair   DC meds/scripts (obs/outpt) Yes, meds   Inpatient Pain Meds Released? Yes       Special needs/considerations None   Tasks needing completion None       Lavinia Ponce, RN  [facility] [id number]

## 2024-01-02 NOTE — LETTER
Recipient: Shaka          Sender: KATHLEEN Santana at [facility] [phone]          Date: January 4, 2024  Patient Name:  Garth Montgomery  Patient YOB: 1945  Routing Message: Please review for TCU placement. Patient is medically ready to discharge at any time. Call with any questions or concerns. Thank you! [phone]        The documents accompanying this notice contain confidential information belonging to the sender.  This information is intended only for the use of the individual or entity named above.  The authorized recipient of this information is prohibited from disclosing this information to any other party and is required to destroy the information after its stated need has been fulfilled, unless otherwise required by state law.    If you are not the intended recipient, you are hereby notified that any disclosure, copy, distribution or action taken in reliance on the contents of these documents is strictly prohibited.  If you have received this document in error, please return it by fax to [phone] with a note on the cover sheet explaining why you are returning it (e.g. not your patient, not your provider, etc.).  If you need further assistance, please call .  Documents may also be returned by mail to [email or web address], , [address], [place], [place] [address].

## 2024-01-02 NOTE — OR NURSING
Eldon Nunez NP to evaluate for inpatient stay or discharge to home.     Dr Hagen and Dr Wade at bedside to evaluate Garth, they are aware her son lives in a split level home and are okay for Garth to discharge to her son's home and his care.

## 2024-01-02 NOTE — LETTER
Recipient: Michael Beckham)          Sender: Angy PradoKATHLEEN at [facility] [phone]          Date: January 4, 2024  Patient Name:  Garth Montgomery  Patient YOB: 1945  Routing Message: Please review for TCU placement. Patient is medically ready to discharge. Patient is open to [place]. Please call with any questions or concerns. Thank you!         The documents accompanying this notice contain confidential information belonging to the sender.  This information is intended only for the use of the individual or entity named above.  The authorized recipient of this information is prohibited from disclosing this information to any other party and is required to destroy the information after its stated need has been fulfilled, unless otherwise required by state law.    If you are not the intended recipient, you are hereby notified that any disclosure, copy, distribution or action taken in reliance on the contents of these documents is strictly prohibited.  If you have received this document in error, please return it by fax to [phone] with a note on the cover sheet explaining why you are returning it (e.g. not your patient, not your provider, etc.).  If you need further assistance, please call .  Documents may also be returned by mail to [employer], , [address], [place], [place] [address].

## 2024-01-03 ENCOUNTER — APPOINTMENT (OUTPATIENT)
Dept: PHYSICAL THERAPY | Facility: CLINIC | Age: 79
End: 2024-01-03
Attending: ORTHOPAEDIC SURGERY
Payer: COMMERCIAL

## 2024-01-03 LAB
ANION GAP SERPL CALCULATED.3IONS-SCNC: 10 MMOL/L (ref 7–15)
BUN SERPL-MCNC: 8.1 MG/DL (ref 8–23)
CALCIUM SERPL-MCNC: 9 MG/DL (ref 8.8–10.2)
CHLORIDE SERPL-SCNC: 101 MMOL/L (ref 98–107)
CREAT SERPL-MCNC: 0.56 MG/DL (ref 0.51–0.95)
DEPRECATED HCO3 PLAS-SCNC: 26 MMOL/L (ref 22–29)
EGFRCR SERPLBLD CKD-EPI 2021: >90 ML/MIN/1.73M2
GLUCOSE SERPL-MCNC: 114 MG/DL (ref 70–99)
HOLD SPECIMEN: NORMAL
POTASSIUM SERPL-SCNC: 3.7 MMOL/L (ref 3.4–5.3)
SODIUM SERPL-SCNC: 137 MMOL/L (ref 135–145)

## 2024-01-03 PROCEDURE — 36415 COLL VENOUS BLD VENIPUNCTURE: CPT | Performed by: INTERNAL MEDICINE

## 2024-01-03 PROCEDURE — 97161 PT EVAL LOW COMPLEX 20 MIN: CPT | Mod: GP

## 2024-01-03 PROCEDURE — 97116 GAIT TRAINING THERAPY: CPT | Mod: GP

## 2024-01-03 PROCEDURE — 250N000013 HC RX MED GY IP 250 OP 250 PS 637: Performed by: PHYSICIAN ASSISTANT

## 2024-01-03 PROCEDURE — 250N000013 HC RX MED GY IP 250 OP 250 PS 637: Performed by: ORTHOPAEDIC SURGERY

## 2024-01-03 PROCEDURE — 82374 ASSAY BLOOD CARBON DIOXIDE: CPT | Performed by: INTERNAL MEDICINE

## 2024-01-03 PROCEDURE — 97530 THERAPEUTIC ACTIVITIES: CPT | Mod: GP

## 2024-01-03 PROCEDURE — 99214 OFFICE O/P EST MOD 30 MIN: CPT | Performed by: INTERNAL MEDICINE

## 2024-01-03 RX ADMIN — GABAPENTIN 100 MG: 100 CAPSULE ORAL at 22:45

## 2024-01-03 RX ADMIN — ACETAMINOPHEN 975 MG: 325 TABLET, FILM COATED ORAL at 12:23

## 2024-01-03 RX ADMIN — OXYCODONE HYDROCHLORIDE 10 MG: 10 TABLET ORAL at 06:50

## 2024-01-03 RX ADMIN — ACETAMINOPHEN 975 MG: 325 TABLET, FILM COATED ORAL at 23:25

## 2024-01-03 RX ADMIN — BUPROPION HYDROCHLORIDE 150 MG: 150 TABLET, EXTENDED RELEASE ORAL at 08:28

## 2024-01-03 RX ADMIN — SENNOSIDES AND DOCUSATE SODIUM 1 TABLET: 50; 8.6 TABLET ORAL at 08:28

## 2024-01-03 RX ADMIN — ACETAMINOPHEN 975 MG: 325 TABLET, FILM COATED ORAL at 06:19

## 2024-01-03 RX ADMIN — SENNOSIDES AND DOCUSATE SODIUM 1 TABLET: 50; 8.6 TABLET ORAL at 22:45

## 2024-01-03 RX ADMIN — POLYETHYLENE GLYCOL 3350 17 G: 17 POWDER, FOR SOLUTION ORAL at 08:28

## 2024-01-03 RX ADMIN — METHOCARBAMOL 500 MG: 500 TABLET ORAL at 06:50

## 2024-01-03 ASSESSMENT — ACTIVITIES OF DAILY LIVING (ADL)
ADLS_ACUITY_SCORE: 32
DEPENDENT_IADLS:: INDEPENDENT
ADLS_ACUITY_SCORE: 32

## 2024-01-03 NOTE — PROGRESS NOTES
Vital signs and mental status at baseline: yes     - Oral intake tolerated (at least 100 mLs fluid PO, at least 25% of a snack or meal, and oral intake on at least two separate occasions) : yes     - Able to ambulate at least 30 feet: no    - Must void prior to discharge unless Angel inserted per Neurosurgery Bladder Management Algorithm: Voiding spontaneously     - Adequate pain control using oral analgesics: yes     - Hypercapnia, hypoventilation or hypoxia resolved for at least 2 hours without supplemental oxygen: O2 sat 97% on RA     - Deficits in sensation, mobility or coordination have resolved if spinal or regional anesthesia was used: Denies numbness or tingling     - Notify Provider IF patient FAILS to meet Discharge Goal criteria.

## 2024-01-03 NOTE — PROGRESS NOTES
Vital signs and mental status at baseline: yes    - Oral intake tolerated (at least 100 mLs fluid PO, at least 25% of a snack or meal, and oral intake on at least two separate occasions): drinking fluids, appetite 75%  for meals     - Able to ambulate at least 30 feet: no     - Must void prior to discharge unless Angel inserted per Neurosurgery Bladder Management Algorithm: voiding spontaneously     - Adequate pain control using oral analgesics.: pain managed with scheduled Tylenol   - Hypercapnia, hypoventilation or hypoxia resolved for at least 2 hours without supplemental oxygen: 97% on RA     - Deficits in sensation, mobility or coordination have resolved if spinal or regional anesthesia was used: denies numbness or tingling

## 2024-01-03 NOTE — CONSULTS
Care Management Initial Consult    General Information  Assessment completed with: Patient    Type of CM/SW Visit: Initial Assessment  Primary Care Provider verified and updated as needed: Yes   Readmission within the last 30 days: no previous admission in last 30 days  Reason for Consult: discharge planning  Advance Care Planning: other (see comments) (No HCD on file, however, patient is adamant shes completed one).     Communication Assessment  Patient's communication style: spoken language (English or Bilingual)    Hearing Difficulty or Deaf: no   Wear Glasses or Blind: yes    Cognitive  Cognitive/Neuro/Behavioral: WDL    Level of Consciousness: alert    Arousal Level: opens eyes spontaneously       Mood/Behavior: calm, cooperative          Living Environment  People in home: alone     Current living Arrangements: house      Able to return to prior arrangements: yes    Family/Social Support  Care provided by: self  Provides care for: no one  Marital Status:   Support System: Children, Other (specify) (Friends)          Description of Support System: Supportive, Involved    Support Assessment: Adequate family and caregiver support, Adequate social supports    Current Resources  Patient receiving home care services: No  Community Resources: None  Equipment currently used at home: walker, rolling, cane, straight, shower chair, grab bar, tub/shower  Supplies currently used at home: None    Employment/Financial  Employment Status: retired     Financial Concerns: none   Referral to Financial Worker: No  Does the patient's insurance plan have a 3 day qualifying hospital stay waiver? No    Lifestyle & Psychosocial Needs  Social Determinants of Health     Food Insecurity: Not on file   Depression: Not at risk (11/16/2023)    PHQ-2     PHQ-2 Score: 1   Housing Stability: Not on file   Tobacco Use: Low Risk  (1/2/2024)    Patient History     Smoking Tobacco Use: Never     Smokeless Tobacco Use: Never     Passive  Exposure: Never   Financial Resource Strain: Not on file   Alcohol Use: Not on file   Transportation Needs: Not on file   Physical Activity: Not on file   Interpersonal Safety: Not on file   Stress: Not on file   Social Connections: Not on file     Functional Status  Prior to admission patient needed assistance: No  Dependent ADLs: Independent  Dependent IADLs: Independent  Assesssment of Functional Status: Needs placement in a SNF/TCF for rehabilitation    Mental Health Status  Mental Health Status: No Current Concerns       Chemical Dependency Status  Chemical Dependency Status: No Current Concerns           Values/Beliefs:  Spiritual, Cultural Beliefs, Muslim Practices, Values that affect care: Yes  Description of Beliefs that Will Affect Care: (Identifies as Roman Catholic)          Additional Information  Garth Montgomery is a 78 year old female with a history of sacral insufficiency fracture, dyslipidemia, depression, and anxiety who was admitted following percutaneous fixation of pelvic fracture and left sacroiliac joint fusion with Dr. Hagen on 1/2/24. There were no immediate postoperative complications.     Social work met with patient to complete initial assessment. Patient reports living alone in [place], [place]. She is a  of 18 years. Patient reports her son lives in [facility] with his wife and children. Garth states [place] is approx 2 hours from [facility]. She identifies her son, VONNIE and girlfriends as support systems. Patient reports she's been using a walker at night to get around but typically patient is independent with all ADLs and IADLs. Patient identifies as Roman Catholic and is interested in using spiritual health services which hospitalized. Spiritual health consult placed. Denies any MH or CD concerns. Patient currently has recs for TCU. She is on board with this. She would like a list of TCUs in the [facility] and [facility] area. THOMAS Ambriz to meet with patient and assist with obtaining  preferences and sending references. Social work will continue to follow.    KATHLEEN Turcios, LGSW  5 Med Surg and 10 ICU   [facility]  Phone: [phone]  Pager: [phone]   Attending Rochelle Dillon: d/w neurosurgery who reviewed scans. unchnaged no acute surgical process at this time Attending Rochelle Dillon: cta negative for acute pe. d/w family and pt results. d/w neurosurgery. cleared for d/c

## 2024-01-03 NOTE — PROGRESS NOTES
[facility]    Medicine Progress Note - Hospitalist Service, GOLD TEAM 18    Date of Admission:  1/2/2024    Assessment & Plan     Garth Montgomery is a 78 year old female with a history of sacral insufficiency fracture, dyslipidemia, depression, and anxiety who was admitted following percutaneous fixation of pelvic fracture and left sacroiliac joint fusion with Dr. Hagen on 1/2/24. There were no immediate postoperative complications. EBL 20 ml.      Sacral Insufficiency Fracture  S/p percutaneous fixation of pelvic fracture and left sacroiliac joint fusion with Dr. Hagen on 1/2/24. EBL 20 ml.   - Orthopedic surgery primary  - Pain regimen: Scheduled and PRN Tylenol, Scheduled gabapentin, PRN oxycodone, Dilaudid, Robaxin  - Bowel regimen   - VTE ppx: per orthopedics   - Touch weightbearing 20-40 lbs LLE x 3 weeks  - PT/Occupational therapy   - [facility]      Depression  Anxiety  - Continue PTA Wellbutrin 150 mg daily. Was only taking PRN prior to admission. Encourage follow up with PCP to discuss medication.      Osteoporosis  - Forteo injection due 1/11/24  - Resume calcium and vit D supplements upon discharge       Vitamin Deficiencies  - Patient would like to hold all vitamins during admission including Vit D, C, B1, calcium. Plans to resume upon discharge.         Hypokalemia  - on K replacement, check BMP           Diet: Regular Diet Adult    DVT Prophylaxis: Defer to primary service  Angel Catheter: Not present  Lines: None     Cardiac Monitoring: None  Code Status: Full Code      Clinically Significant Risk Factors Present on Admission                                  Disposition Plan      Expected Discharge Date: 01/03/2024                    Shruti Sultana MD  Hospitalist Service, GOLD TEAM 18  [facility]  Securely message with [employer] (more info)  Text page via [facility] Paging/Directory   See signed in provider for up to  date coverage information  ______________________________________________________________________    Interval History   She is very pleasant, no pain when sits there, denies any chest pain  no shortness of breath  no nausea vomiting, her son is in her room   Physical therapy  rec TCU     Physical Exam   Vital Signs: Temp: 98.9  F (37.2  C) Temp src: Oral BP: 92/60 Pulse: 91   Resp: 18 SpO2: 99 % O2 Device: Nasal cannula Oxygen Delivery: 2 LPM  Weight: 128 lbs 4.92 oz  General appearance: awake alert in  no apparent distress     HEENT: EOMI and PEARLA, sclera nonicteric, moist mucus membranes, head atraumatic  NECK: supple  RESPIRATORY: lungs are clear   CARDIOVASCULAR: normal S1S2 regular rate and rhythm,   GASTROINTESTINAL: abdomen is , soft,  non-distended, non-tender with normal bowel sounds.  SKIN: warm and dry, no rashes, no mottling noted   NEUROLOGIC: awake alert and oriented        Data     I have personally reviewed the following data over the past 24 hrs:    N/A  \   N/A   / N/A     137 101 8.1 /  114 (H)   3.7 26 0.56 \       Imaging results reviewed over the past 24 hrs:   No results found for this or any previous visit (from the past 24 hour(s)).

## 2024-01-03 NOTE — PROGRESS NOTES
"Orthopedic Surgery Progress Note:     Subjective:   OR yesterday. Pain controlled. Tolerating PO. Discussed plan to work with PT today.     Objective:   BP 92/60   Pulse 91   Temp 98.9  F (37.2  C) (Oral)   Resp 18   Ht 1.6 m (5' 3\")   Wt 58.2 kg (128 lb 4.9 oz)   SpO2 99%   BMI 22.73 kg/m    No intake/output data recorded.  General: NAD. Resting comfortably in bed.  Respiratory: Breathing comfortably on RA.    Musculoskeletal: dressing Clean and dry       Motor Strength Right Left   Hip flexion: L1, L2, L3 5/5 5/5        Knee flexion: S1 5/5 5/5   Knee extension: L3, L4 5/5 5/5   Ankle dosiflexion: L4, L5 5/5 5/5   EHL: L5 5/5 5/5   Ankle plantarflexion: S1 5/5 5/5     Sensation from L1-S2 is preserved.    Laboratory Data:  No results found for: \"WBC\", \"HGB\", \"PLT\", \"INR\"    Images:  No new images were obtained.    Assessment & Plan:   Garth Montgomery is a 78 year old females/p left SI fusion, and percutaneous fixation of left sacral insuffiency fracture on 1/2/23 with Dr. Hagen .     Goals for today:  PT/OT   SW   Possible discharge if mobilizing well.     Spine Primary  - Activity: Up with assist until independent. No excessive bending or twisting. No lifting >10 lbs x 6 weeks. No Jamar lift for transfers.   - Weightbearing Status: TDWB on the LLE   - Pain Management: PO pain medications  - Diet: Begin with clear fluids and progress diet as tolerated.   - DVT Prophylaxis: SCDs only. No chemical DVT prophylaxis needed.  - Labs: Labs PRN.  - Dressings: Keep Aquacel C/D/I x 7 days.  - Physical Therapy/Occupational Therapy: Evaluation and treatment.  - Consults: Hospitalist.  - Follow-up: Clinic with Dr. Hagen in 6 weeks with repeat radiographs.    - Disposition: Pending progress with therapies, pain control on orals, and medical stability; anticipate discharge to home/tcu on POD2-3.    Orthopedic surgery staff for this patient is Dr." Xiao.    ------------------------------------------------------------------------------------------    Jorge aWde MD  Spine Fellow     PLEASE PAGE ME directly with any questions/concerns during regular weekday hours before 5 pm. If there is no response, if it is a weekend, or if it is during evening hours then please page the orthopedic surgery resident on call.    FOLLOWUP:    Future Appointments   Date Time Provider Department Center   1/3/2024  9:30 AM Mona Mendoza PT [facility]   2/13/2024  1:30 PM Jacquelin Melendrez PA-C [facility]   3/21/2024 10:30 AM Jones Hagen MD [facility]

## 2024-01-03 NOTE — PROGRESS NOTES
"   01/03/24 1000   Appointment Info   Signing Clinician's Name / Credentials (PT) Mona Mendoza DPT   Living Environment   People in Home alone;child(carli), adult   Current Living Arrangements house   Home Accessibility stairs to enter home;stairs within home   Number of Stairs, Main Entrance 3   Stair Railings, Main Entrance none   Number of Stairs, Within Home, Primary eight   Stair Railings, Within Home, Primary railing on right side (ascending)   Transportation Anticipated family or friend will provide   Living Environment Comments typically lives alone, but will stay at sons with 3 CELY (no rail) and then split level with single rail up to main floor. Has tub/ with no rail or bench currenty. Pt's son and his wife both work during day so pt will be need to be alone and IND.   Self-Care   Usual Activity Tolerance good   Current Activity Tolerance moderate   Regular Exercise No   Equipment Currently Used at Home walker, rolling;cane, straight;shower chair;grab bar, tub/shower  (4WW and FWW)   Fall history within last six months yes   Number of times patient has fallen within last six months 2   Activity/Exercise/Self-Care Comment pt Regine with 4WW at baseline, lives alone and is IND with self cares with DME. Pt will be going to sons, but no one will be present to help at home as they work.   General Information   Onset of Illness/Injury or Date of Surgery 01/02/24   Referring Physician Dr. Hagen   Pertinent History of Current Problem (include personal factors and/or comorbidities that impact the POC) per chart review, \"Garth Montgomery is a 78 year old females/p left SI fusion, and percutaneous fixation of left sacral insuffiency fracture on 1/2/23 with Dr. Hagen.\"   Existing Precautions/Restrictions fall;spinal;weight bearing   Weight-Bearing Status - LUE   (10#)   Weight-Bearing Status - RUE   (10#)   Weight-Bearing Status - LLE partial weight-bearing (% in comments)  (40# L LE per surgeon. Per Rm Nunez, " spine NP verbal for OK to trial WBAT if allowed pt to mobilize better to go home.)   General Observations supine in bed, pleasant, motivated for therapy   Cognition   Affect/Mental Status (Cognition) WNL   Orientation Status (Cognition) oriented x 4   Follows Commands (Cognition) WNL   Pain Assessment   Patient Currently in Pain Yes, see Vital Sign flowsheet   Integumentary/Edema   Integumentary/Edema Comments B LE mild edema, appears baseline   Posture    Posture Kyphosis   Range of Motion (ROM)   ROM Comment WFL of spine precautions   Strength (Manual Muscle Testing)   Strength Comments B LEs grossly 4/5, limited by pain in L LE though   Bed Mobility   Comment, (Bed Mobility) Regine with rail use and HOB elevated   Transfers   Comment, (Transfers) multiple attempts needed to stand, ultimately CGA to 4WW   Gait/Stairs (Locomotion)   Comment, (Gait/Stairs) pt ambulates with 4WW and CGA, unable to maintain 40# TDWB L LE, amb with WBAT   Balance   Balance Comments IND sitting, able to stand without UE support offloading on R LE to pull up brief, fair standing balance   Sensory Examination   Sensory Perception patient reports no sensory changes   Clinical Impression   Criteria for Skilled Therapeutic Intervention Yes, treatment indicated   PT Diagnosis (PT) impaired functional mobility   Influenced by the following impairments reduced activity tolerance, impaired WBing L LE, pain   Functional limitations due to impairments bed mobility, transfers gait, stairs   Clinical Presentation (PT Evaluation Complexity) stable   Clinical Presentation Rationale PMH, clinician impression   Clinical Decision Making (Complexity) low complexity   Planned Therapy Interventions (PT) balance training;bed mobility training;gait training;home exercise program;neuromuscular re-education;patient/family education;strengthening;stair training;ROM (range of motion);transfer training;progressive activity/exercise;risk factor education;home program  guidelines   Risk & Benefits of therapy have been explained evaluation/treatment results reviewed;care plan/treatment goals reviewed;risks/benefits reviewed;current/potential barriers reviewed;participants voiced agreement with care plan;participants included;patient   Clinical Impression Comments well below Regine 4WW baseline, poor tolerance, unsafe to return home alone   PT Total Evaluation Time   PT Eval, Low Complexity Minutes (41515) 8   Physical Therapy Goals   PT Frequency Daily   PT Predicted Duration/Target Date for Goal Attainment 01/08/24   PT Goals Bed Mobility;Transfers;Gait;Stairs   PT: Bed Mobility Independent;Supine to/from sit;Rolling;Within precautions   PT: Transfers Modified independent;Sit to/from stand;Bed to/from chair;Assistive device;Within precautions   PT: Gait Modified independent;Rolling walker;150 feet   PT: Stairs Supervision/stand-by assist;8 stairs;Rail on right  (+ 3 CELY no rail Edmundo)   Interventions   Interventions Quick Adds Therapeutic Activity;Gait Training   Therapeutic Activity   Therapeutic Activities: dynamic activities to improve functional performance Minutes (76767) 15   Symptoms Noted During/After Treatment Fatigue;Increased pain   Treatment Detail/Skilled Intervention PT: pt supine in bed, very pleasant but immediately expressing concern for dischargng home today. She feel shse needs TCU. Educated pt on anticiapted dispo for planned OP procedure to return home, PT role and TCU rec only indicated if unable to care for self at home. Pt understanding, continues to politely detail her concerns. Educated on spine precautions and mobility implications. Pt performs supine>sit with HOB elevated and min rail use with SBA, IND EOB sitting. Pt requires multiple attempts to stand, ultimately needing CGA for safety. After gait (see below), pt requesting toielting use. CGA/close SBA with 4WW for toilet transfer with grab bar, very slow, needs CGA and vc for postioning for safety with  grab bar use. Pt is able to comlpete pericares and brief managemetn without physical assist, fair standing balance. Pt requires re-education for spine precautions on goes to  trash off floor in bathroom, breaking bendign restrictions. Encoruaged up to chair to promote OOB activity tolerance and improved recovery, pt agreement. VSS on RA upon check up on chair. Disucssed PT-observed barriers to return home and would not be safe currently, will likely require ongoing rehab. Did ensure pt understands though PT will follow her here and if meets goals needed for safe discharge prior to obtaining TCU placement whe would discharge from here. Pt understanding, but wants to ensure she is safe. PT supportive and in agreement we would not discharge her before she has safe plan. All cares in reach end of session, counseled on increased OOB activity and working with OT to promote improved activity and IND.   Gait Training   Gait Training Minutes (07250) 10   Symptoms Noted During/After Treatment (Gait Training) increased pain;fatigue   Treatment Detail/Skilled Intervention pt educated on 40# TDWB status but also OK'd to trial WBAT (per Rm spine NP) to determine if improves mobility enough to support discharge home. Pt could not maintain 40# at all so WBAT throughout. Pt ambulates x100' with 4WW and CGA/intermittent Edmundo. Pt with hunched posture, shuffled steps, step to pattern, heavy UE reliance and placing elbows on walker for support most of time. Clear that her personal 4WW not supportive endought to offload L LE. PT applied brakes for added support but minimal improvement. Transitioend to FWW with slight improvement yet, but still needing vc for postioning as with FWW too far out, not supportive enough to promote upright poasture and stability needed to ambulate alone, high falls risk. Pt not safe to trial stairs today 2/2 pain and weakness.   PT Discharge Planning   PT Plan clarify if needs to stay 40# TDWB or  WBAT- progress gait, strengthening   PT Discharge Recommendation (DC Rec) Transitional Care Facility   PT Rationale for DC Rec pt well below baseline. She requires Ax1 for all mobility and would not be safe to return to a home environment alone- will not have 24/7 support at son's house. Pt unable to maintain 40# TDWB and even when WBAT (trialed per REGINALD verbal request) she is still unsafe with mobility and unable to perform the many stairs needed access sons home. Would greatly benefit from TCU to progress mobility and IND function. Pt very motivated and anticipate will do well with rehab stay.   PT Brief overview of current status Ax1 with FWW   Total Session Time   Timed Code Treatment Minutes 25   Total Session Time (sum of timed and untimed services) 33   [facility]  OUTPATIENT PHYSICAL THERAPY EVALUATION  PLAN OF TREATMENT FOR OUTPATIENT REHABILITATION  (COMPLETE FOR INITIAL CLAIMS ONLY)  Patient's Last Name, First Name, M.I.  YOB: 1945  MontgomeryGarth jhaveri  ASAD                        Provider's Name  [facility] Medical Record No.  [id number]                             Onset Date:  01/02/24   Start of Care Date:      Type:     _X_PT   ___OT   ___SLP Medical Diagnosis:                 PT Diagnosis:  impaired functional mobility Visits from SOC:  1     See note for plan of treatment, functional goals and certification details    I CERTIFY THE NEED FOR THESE SERVICES FURNISHED UNDER        THIS PLAN OF TREATMENT AND WHILE UNDER MY CARE     (Physician co-signature of this document indicates review and certification of the therapy plan).

## 2024-01-03 NOTE — PLAN OF CARE
"  Problem: Adult Inpatient Plan of Care  Goal: Patient-Specific Goal (Individualized)  Description: You can add care plan individualizations to a care plan. Examples of Individualization might be:  \"Parent requests to be called daily at 9am for status\", \"I have a hard time hearing out of my right ear\", or \"Do not touch me to wake me up as it startles  me\".  Outcome: Progressing  Goal: Absence of Hospital-Acquired Illness or Injury  Outcome: Progressing  Intervention: Prevent Skin Injury  Recent Flowsheet Documentation  Taken 1/3/2024 0147 by Brent Parks RN  Body Position:   position changed independently   position maintained  Intervention: Prevent and Manage VTE (Venous Thromboembolism) Risk  Recent Flowsheet Documentation  Taken 1/3/2024 0147 by Brent Parks RN  VTE Prevention/Management: SCDs (sequential compression devices) off  Intervention: Prevent Infection  Recent Flowsheet Documentation  Taken 1/3/2024 0147 by Brent Parks RN  Infection Prevention:   hand hygiene promoted   rest/sleep promoted   single patient room provided   equipment surfaces disinfected  Goal: Optimal Comfort and Wellbeing  Outcome: Progressing  Goal: Readiness for Transition of Care  Outcome: Progressing  Flowsheets (Taken 1/3/2024 0731)  Anticipated Changes Related to Illness: none  Transportation Anticipated: family or friend will provide  Concerns to be Addressed: discharge planning  Intervention: Mutually Develop Transition Plan  Recent Flowsheet Documentation  Taken 1/3/2024 0731 by Brent Parks RN  Anticipated Changes Related to Illness: none  Transportation Anticipated: family or friend will provide  Concerns to be Addressed: discharge planning   Goal Outcome Evaluation:             Pt. Slept most of the shift. Pt. appears to be pleasant and calm. No acute changes. Possible discharge this shift. Continue POC.              "

## 2024-01-04 ENCOUNTER — APPOINTMENT (OUTPATIENT)
Dept: PHYSICAL THERAPY | Facility: CLINIC | Age: 79
End: 2024-01-04
Attending: ORTHOPAEDIC SURGERY
Payer: COMMERCIAL

## 2024-01-04 ENCOUNTER — APPOINTMENT (OUTPATIENT)
Dept: OCCUPATIONAL THERAPY | Facility: CLINIC | Age: 79
End: 2024-01-04
Attending: NURSE PRACTITIONER
Payer: COMMERCIAL

## 2024-01-04 PROCEDURE — 250N000013 HC RX MED GY IP 250 OP 250 PS 637: Performed by: PHYSICIAN ASSISTANT

## 2024-01-04 PROCEDURE — 99214 OFFICE O/P EST MOD 30 MIN: CPT | Performed by: INTERNAL MEDICINE

## 2024-01-04 PROCEDURE — 97530 THERAPEUTIC ACTIVITIES: CPT | Mod: GP

## 2024-01-04 PROCEDURE — 97116 GAIT TRAINING THERAPY: CPT | Mod: GP

## 2024-01-04 PROCEDURE — 250N000013 HC RX MED GY IP 250 OP 250 PS 637: Performed by: ORTHOPAEDIC SURGERY

## 2024-01-04 PROCEDURE — 97535 SELF CARE MNGMENT TRAINING: CPT | Mod: GO | Performed by: OCCUPATIONAL THERAPIST

## 2024-01-04 PROCEDURE — 97165 OT EVAL LOW COMPLEX 30 MIN: CPT | Mod: GO | Performed by: OCCUPATIONAL THERAPIST

## 2024-01-04 RX ORDER — TRAMADOL HYDROCHLORIDE 50 MG/1
50 TABLET ORAL EVERY 6 HOURS PRN
Status: DISCONTINUED | OUTPATIENT
Start: 2024-01-04 | End: 2024-01-05 | Stop reason: HOSPADM

## 2024-01-04 RX ADMIN — GABAPENTIN 100 MG: 100 CAPSULE ORAL at 22:41

## 2024-01-04 RX ADMIN — ACETAMINOPHEN 975 MG: 325 TABLET, FILM COATED ORAL at 11:55

## 2024-01-04 RX ADMIN — ACETAMINOPHEN 975 MG: 325 TABLET, FILM COATED ORAL at 05:35

## 2024-01-04 RX ADMIN — POLYETHYLENE GLYCOL 3350 17 G: 17 POWDER, FOR SOLUTION ORAL at 08:32

## 2024-01-04 RX ADMIN — ACETAMINOPHEN 975 MG: 325 TABLET, FILM COATED ORAL at 20:39

## 2024-01-04 RX ADMIN — SENNOSIDES AND DOCUSATE SODIUM 1 TABLET: 50; 8.6 TABLET ORAL at 20:40

## 2024-01-04 RX ADMIN — BUPROPION HYDROCHLORIDE 150 MG: 150 TABLET, EXTENDED RELEASE ORAL at 08:31

## 2024-01-04 RX ADMIN — SENNOSIDES AND DOCUSATE SODIUM 1 TABLET: 50; 8.6 TABLET ORAL at 08:32

## 2024-01-04 RX ADMIN — OXYCODONE HYDROCHLORIDE 5 MG: 5 TABLET ORAL at 08:32

## 2024-01-04 ASSESSMENT — ACTIVITIES OF DAILY LIVING (ADL)
ADLS_ACUITY_SCORE: 32

## 2024-01-04 NOTE — PLAN OF CARE
"No acute changes or concerns during NOC shift. Patient denied pain, VSS, no SOB or cough noted. Pt appears to have slept through the night with no concerns. Continue with POC.    Patient's most recent vitals:  /83 (BP Location: Left arm)   Pulse 92   Temp 98.4  F (36.9  C) (Oral)   Resp 18   Ht 1.6 m (5' 3\")   Wt 58.2 kg (128 lb 4.9 oz)   SpO2 99%   BMI 22.73 kg/m     "

## 2024-01-04 NOTE — PROGRESS NOTES
"Orthopedic Surgery Progress Note:     Subjective:   Denies any radiating leg pain. Reports pain around the incision site. Unable to ambulate with PT yesterday. Rec TCU.    Objective:   /83 (BP Location: Left arm)   Pulse 92   Temp 98.4  F (36.9  C) (Oral)   Resp 18   Ht 1.6 m (5' 3\")   Wt 58.2 kg (128 lb 4.9 oz)   SpO2 99%   BMI 22.73 kg/m    No intake/output data recorded.  General: NAD. Resting comfortably in bed.  Respiratory: Breathing comfortably on RA.    Musculoskeletal: dressing CDI      Motor Strength Right Left   Hip flexion: L1, L2, L3 5/5 5/5           Knee flexion: S1 5/5 5/5   Knee extension: L3, L4 5/5 5/5   Ankle dosiflexion: L4, L5 5/5 5/5   EHL: L5 5/5 5/5   Ankle plantarflexion: S1 5/5 5/5      Sensation from L1-S2 is preserved.    Laboratory Data:  No results found for: \"WBC\", \"HGB\", \"PLT\", \"INR\"    Images:  No new images were obtained.    Assessment & Plan:   Garth Montgomery is a 78 year old females/p left SI fusion, and percutaneous fixation of left sacral insuffiency fracture on 1/2/23 with Dr. Hagen   .      Goals for today:  PT/OT   SW        Spine Primary  - Activity: Up with assist until independent. No excessive bending or twisting. No lifting >10 lbs x 6 weeks. No Jamar lift for transfers.   - Weightbearing Status: TDWB on the LLE   - Pain Management: PO pain medications  - Diet: Begin with clear fluids and progress diet as tolerated.   - DVT Prophylaxis: SCDs only. No chemical DVT prophylaxis needed.  - Labs: Labs PRN.  - Dressings: Keep Aquacel C/D/I x 7 days.  - Physical Therapy/Occupational Therapy: Evaluation and treatment.  - Consults: Hospitalist.  - Follow-up: Clinic with Dr. Hagen in 6 weeks with repeat radiographs.     - Disposition: Pending progress with therapies, pain control on orals, and medical stability; anticipate discharge to home/tcu on POD2-3.     Orthopedic surgery staff for this patient is Dr. Hagen.    Jorge Wade MD  Spine Fellow     PLEASE PAGE ME " directly with any questions/concerns during regular weekday hours before 5 pm. If there is no response, if it is a weekend, or if it is during evening hours then please page the orthopedic surgery resident on call.    FOLLOWUP:    Future Appointments   Date Time Provider Department Center   1/4/2024  7:30 AM Ravindra Guevara, OT [facility] [place]   1/4/2024 11:30 AM Mona Mendoza, PT [facility] [place]   2/13/2024  1:30 PM Jacquelin Melendrez PA-C [facility]   3/21/2024 10:30 AM Jones Hagen MD [facility] hospital

## 2024-01-04 NOTE — UTILIZATION REVIEW
Concurrent stay review; Secondary Review Determination - [facility]        Under the authority of the Utilization Management Committee, the utilization review process indicated a secondary review on the above patient.  The review outcome is based on review of the medical records, discussions with staff, and applying clinical experience noted on the date of the review.        (x) Observation/outpatient Status Appropriate - Concurrent stay review       RATIONALE FOR DETERMINATION: 78-year-old female who sustained a low-energy pelvic insufficiency fracture along with a right-sided L5 pedicle fracture.  Patient underwent a percutaneous fixation of pelvic fracture with minimally invasive sacroiliac joint fusion on the left on 1/2/2024.  Patient is tolerated diet and has adequate pain control with oral medications.  Remains in hospital awaiting placement due to difficulty with ambulation.    Patient delayed discharge is related to disposition, there is no medical necessity for inpatient admission at the time of this review. If there is a change in patient status, please resend for review.    The information on this document is developed by the utilization review team in order for the business office to ensure compliance.  This only denotes the appropriateness of proper admission status and does not reflect the quality of care rendered.       The definitions of Inpatient Status and Observation Status used in making the determination above are those provided in the CMS Coverage Manual, Chapter 1 and Chapter 6, section 70.4.       Sincerely,    Homero Rojas MD, MD

## 2024-01-04 NOTE — CONSULTS
"SPIRITUAL HEALTH SERVICES - Consult Note  5B   Referral Source/Reason for Visit: Routine consult    Summary and Recommendations -  Supportive visit with Garth post-surgery. Garth identified feeling, \"Much better\", though \"lonesome\"  We shared a dino conversation about her life, the death of her , her health, and her Orthodox jhony.   Garth appreciates conversation during her stay, and wasn't sure if family could visit here. I encouraged that visits are possible, and to inquire for details with her RN. She is hopeful to discharge soon back to her home of 52 years.   Garth's jhony is important to her, and we shared in prayer.     Plan: I will continue to follow as needed.     Alba Aaron M.Div.  Chaplain Resident  Pager [phone]    [facility] available 24/7 for emergent requests/referrals, either by paging the on-call  or by entering an ASAP/STAT consult in [employer], which will also page the on-call .    Assessment    Saw pt Garth Montgomery per routine consult.    Patient/Family Understanding of Illness and Goals of Care - Garth shared that she feels much better already and was glad to walk the hallways. She is hoping to feel back to normal after this surgery, \"I couldn't garden like I love to for the past year\". She is hopeful to discharge and be back to her home of 52 years soon.     Distress and Loss - Garth became tearful as she reflected on the death of her  18 years ago. I provided emotional support as we explored her love for him, and that missing him is exacerbated by a hospital stay. She identified feeling lonely and missing her beloved home, which I validated and encouraged her extended family in [place] to visit if possible.     Strengths, Coping, and Resources - Garth has a broad community of support in [place], from her [employer] and dear friends and siblings, as well as her son. She has a daily coffee date with a fellow , and I affirmed the importance of these many generative " relationships. She volunteers in many places and likes to stay busy, a lifestyle she's hoping to return to soon. She shared that she forgot her book and word finds, and I offered to print some off and bring them back this afternoon.     Meaning, Beliefs, and Spirituality - Garth is a lifelong Spiritism and draws upon her jhony for strength. She prays the rosary often and uses tangible symbols of her jhony, like prayer stones and rosaries. I offered to pray, which she accepted. Should Garth remain hospitalized, she would appreciate continued visits.

## 2024-01-04 NOTE — PLAN OF CARE
Goal Outcome Evaluation:      Plan of Care Reviewed With: patient, child    Overall Patient Progress: improvingOverall Patient Progress: improving  Alert & oriented x4, able to make needs known. Needs SBa with transfers and toileting, needs reminders for weight bearing restrictions. Continent of bladder and bowel, LBM 1/1, passing flatus. Surgical drsg CDI. Pain managed with scheduled Tylenol. Plan to discharge to TCU.

## 2024-01-04 NOTE — PLAN OF CARE
Goal Outcome Evaluation:      Plan of Care Reviewed With: patient    Overall Patient Progress: improvingOverall Patient Progress: improving  Alert & oriented x4, able to make needs known. Needs SBA with transfers and toileting. Continent of bladder and bowel, no BM yet, but passing flatus. Appetite 75% for meals. Surgical incision CDI, denies N/T. Oxycodone PRN given for incisional pain with relief. Ambulated in hallway with therapy. Plan to discharge to TCU.

## 2024-01-04 NOTE — PROGRESS NOTES
[facility]    Medicine Progress Note - Hospitalist Service, GOLD TEAM 18    Date of Admission:  1/2/2024    Assessment & Plan     Garth Montgomery is a 78 year old female with a history of sacral insufficiency fracture, dyslipidemia, depression, and anxiety who was admitted following percutaneous fixation of pelvic fracture and left sacroiliac joint fusion with Dr. Hagen on 1/2/24. There were no immediate postoperative complications. EBL 20 ml.      Sacral Insufficiency Fracture  S/p percutaneous fixation of pelvic fracture and left sacroiliac joint fusion with Dr. Hagen on 1/2/24. EBL 20 ml.   - Orthopedic surgery primary  - Pain regimen: Scheduled and PRN Tylenol, Scheduled gabapentin, PRN oxycodone, Dilaudid, Robaxin  - Bowel regimen   - VTE ppx: per orthopedics   - Touch weightbearing 20-40 lbs LLE x 3 weeks  - PT/Occupational therapy   - LA rec TCU      Depression  Anxiety  - Continue PTA Wellbutrin 150 mg daily. Was only taking PRN prior to admission. Encourage follow up with PCP to discuss medication.      Osteoporosis  - Forteo injection due 1/11/24  - Resume calcium and vit D supplements upon discharge       Vitamin Deficiencies  - Patient would like to hold all vitamins during admission including Vit D, C, B1, calcium. Plans to resume upon discharge.         Hypokalemia  - on K replacement, check BMP           Diet: Regular Diet Adult    DVT Prophylaxis: Defer to primary service  Angel Catheter: Not present  Lines: None     Cardiac Monitoring: None  Code Status: Full Code      Clinically Significant Risk Factors Present on Admission                           # Financial/Environmental Concerns: none         Disposition Plan           Shruti Sultana MD  Hospitalist Service, GOLD TEAM 18  [facility]  Securely message with [employer] (more info)  Text page via [employer] Paging/Directory   See signed in provider for up to date  coverage information  ______________________________________________________________________    Interval History   Was ambulating with physical therapy  in [place], still lots of pain on movement but better at rest, denies any chest pain  no shortness of breath      Physical Exam   Vital Signs: Temp: 98.4  F (36.9  C) Temp src: Oral BP: 131/83 Pulse: 92   Resp: 18 SpO2: 99 % O2 Device: None (Room air)    Weight: 128 lbs 4.92 oz  General appearance: awake alert in  no apparent distress     HEENT: EOMI and PEARLA, sclera nonicteric, moist mucus membranes, head atraumatic  NECK: supple  RESPIRATORY: lungs are clear   CARDIOVASCULAR: normal S1S2 regular rate and rhythm,   GASTROINTESTINAL: abdomen is , soft,  non-distended, non-tender with normal bowel sounds.  SKIN: warm and dry, no rashes, no mottling noted   NEUROLOGIC: awake alert and oriented        Data     I have personally reviewed the following data over the past 24 hrs:    N/A  \   N/A   / N/A     137 101 8.1 /  114 (H)   3.7 26 0.56 \       Imaging results reviewed over the past 24 hrs:   No results found for this or any previous visit (from the past 24 hour(s)).

## 2024-01-04 NOTE — PROGRESS NOTES
Care Management Follow Up    Length of Stay (days): 0    Expected Discharge Date: 1/5/24     Concerns to be Addressed: Discharge planning     Patient plan of care discussed at interdisciplinary rounds: Yes    Anticipated Discharge Disposition: Transitional Care     Anticipated Discharge Services: None  Anticipated Discharge DME: None    Patient/family educated on Medicare website which has current facility and service quality ratings: Yes  Education Provided on the Discharge Plan: Yes  Patient/Family in Agreement with the Plan: Yes    Referrals Placed by CM/SW: External Care Coordination  Private pay costs discussed: Not applicable    Additional Information:  Social work met with patient to obtain TCU preferences. Referrals have been sent, see below. Care management will continue to follow.     Accepted:  [facility]  [address] [address]  P: [phone]  F: [phone]  1/4- Accepted to TCU. Social work is going to put in for insurance auth early tomorrow morning via phone in hopes that I will be able to get acceptance right away for patient to discharge no later than 12pm. Patient would need to arrive to the facility by 2pm tomorrow.     Pending:  [facility]  [address] [place] [address]  P: [phone]  F: [phone]    [facility] (Katie)  P: [phone]  F: [phone]  *[facility] the [facility]   [address] [place] [address]  P: [phone]  F: [phone]    [facility]  [address] [place] [address]  P: [phone]  F: [phone]    KATHLEEN Turcios, LGSW  5 Med Surg and 10 ICU   [facility]  Phone: [phone]  Pager: [phone]

## 2024-01-04 NOTE — PROGRESS NOTES
01/04/24 1500   Appointment Info   Signing Clinician's Name / Credentials (OT) Ravindra Guevara OTR/L   Living Environment   People in Home alone;child(carli), adult   Current Living Arrangements house   Home Accessibility stairs to enter home;stairs within home   Number of Stairs, Main Entrance 3   Stair Railings, Main Entrance none   Number of Stairs, Within Home, Primary eight   Stair Railings, Within Home, Primary railing on right side (ascending)   Transportation Anticipated agency;family or friend will provide   Living Environment Comments typically lives alone, but will stay at sons with 3 CELY (no rail) and then split level with single rail up to main floor. Has tub/ with no rail or bench currenty. Pt's son and his wife both work during day so pt will be need to be alone and IND.   Self-Care   Usual Activity Tolerance good   Current Activity Tolerance moderate   Regular Exercise No   Equipment Currently Used at Home walker, rolling;cane, straight;shower chair;grab bar, tub/shower   Fall history within last six months yes   Number of times patient has fallen within last six months 2   Activity/Exercise/Self-Care Comment pt Regine with 4WW at baseline, lives alone and is IND with self cares with DME. Pt will be going to sons, but no one will be present to help at home as they work.   General Information   Onset of Illness/Injury or Date of Surgery 01/02/24   Referring Physician Rm Nunez   Additional Occupational Profile Info/Pertinent History of Current Problem Garth Montgomery is a 78 year old females/p left SI fusion, and percutaneous fixation of left sacral insuffiency fracture on 1/2/23 with Dr. Hagen   .   Existing Precautions/Restrictions weight bearing   Left Lower Extremity (Weight-bearing Status) touch down weight-bearing (TDWB);partial weight-bearing (PWB)  (50%)   Cognitive Status Examination   Orientation Status orientation to person, place and time   Visual Perception   Visual  Impairment/Limitations WFL   Pain Assessment   Patient Currently in Pain Yes, see Vital Sign flowsheet   Range of Motion Comprehensive   General Range of Motion no range of motion deficits identified;bilateral upper extremity ROM WFL   Comment, General Range of Motion B UE WFL   Strength Comprehensive (MMT)   General Manual Muscle Testing (MMT) Assessment no strength deficits identified   Comment, General Manual Muscle Testing (MMT) Assessment B UE WFL   Coordination   Upper Extremity Coordination No deficits were identified   Bed Mobility   Comment (Bed Mobility) SBA   Transfers   Transfer Comments SBA for standing from bed   Activities of Daily Living   BADL Assessment/Intervention   (increased assist per clinical judgement)   Clinical Impression   Criteria for Skilled Therapeutic Interventions Met (OT) Yes, treatment indicated   OT Diagnosis decreased ADL independence   OT Problem List-Impairments impacting ADL problems related to;activity tolerance impaired;post-surgical precautions;pain;strength   Assessment of Occupational Performance 3-5 Performance Deficits   Planned Therapy Interventions (OT) ADL retraining;IADL retraining;transfer training;strengthening;home program guidelines;progressive activity/exercise   Clinical Decision Making Complexity (OT) problem focused assessment/low complexity   Risk & Benefits of therapy have been explained evaluation/treatment results reviewed;patient   Clinical Impression Comments Pt presents with post surgical pain and WB restrictions, would benefit from skilled OT services to increase safety and independence with ADL.   OT Total Evaluation Time   OT Eval, Low Complexity Minutes (92364) 8   OT Goals   Therapy Frequency (OT) 6 times/week   OT Predicted Duration/Target Date for Goal Attainment 01/12/24   OT Goals Hygiene/Grooming;Lower Body Dressing;Lower Body Bathing;Toilet Transfer/Toileting   OT: Hygiene/Grooming modified independent;within precautions;while standing    OT: Lower Body Dressing Modified independent;within precautions;including set-up/clothing retrieval   OT: Lower Body Bathing Modified independent;with precautions   OT: Toilet Transfer/Toileting Modified independent;toilet transfer;cleaning and garment management;within precautions   OT Discharge Planning   OT Plan standing ADL with 50% WB review, dressing with AE review, toilet transfers   OT Discharge Recommendation (DC Rec) Transitional Care Facility   OT Rationale for DC Rec Pt below baseline, needs to be independent to return home with precautions, would benefit from skilled OT services to increase safety and independence with ADL   OT Brief overview of current status SBA< cuees required for precautions   Total Session Time   Total Session Time (sum of timed and untimed services) 8                                                                                     [facility]      OUTPATIENT OCCUPATIONAL THERAPY  EVALUATION  PLAN OF TREATMENT FOR OUTPATIENT REHABILITATION  (COMPLETE FOR INITIAL CLAIMS ONLY)  Patient's Last Name, First Name, M.I.  YOB: 1945  Garth Montgomery                          Provider's Name  [facility] Medical Record No.  [id number]                               Onset Date:  01/02/24   Start of Care Date:   1/4/23     Type:     ___PT   _X_OT   ___SLP Medical Diagnosis:   s/p SI fusion                        OT Diagnosis:  decreased ADL independence   Visits from SOC:  1   _________________________________________________________________________________  Plan of Treatment/Functional Goals    Planned Interventions: ADL retraining, IADL retraining, transfer training, strengthening, home program guidelines, progressive activity/exercise   Goals: See Occupational Therapy Goals on Care Plan in [facility] electronic health record.    Therapy Frequency: 6 times/week  Predicted Duration of Therapy Intervention:  01/12/24  _________________________________________________________________________________    I CERTIFY THE NEED FOR THESE SERVICES FURNISHED UNDER        THIS PLAN OF TREATMENT AND WHILE UNDER MY CARE     (Physician attestation of this document indicates review and certification of the therapy plan).               ,      Referring Physician: Rm Nunez            Initial Assessment        See Occupational Therapy evaluation dated   in Epic electronic health record.

## 2024-01-05 ENCOUNTER — TELEPHONE (OUTPATIENT)
Dept: ORTHOPEDICS | Facility: CLINIC | Age: 79
End: 2024-01-05
Payer: COMMERCIAL

## 2024-01-05 VITALS
HEIGHT: 63 IN | WEIGHT: 128.31 LBS | DIASTOLIC BLOOD PRESSURE: 88 MMHG | OXYGEN SATURATION: 97 % | HEART RATE: 90 BPM | BODY MASS INDEX: 22.73 KG/M2 | RESPIRATION RATE: 17 BRPM | TEMPERATURE: 97.7 F | SYSTOLIC BLOOD PRESSURE: 122 MMHG

## 2024-01-05 PROCEDURE — 250N000013 HC RX MED GY IP 250 OP 250 PS 637: Performed by: PHYSICIAN ASSISTANT

## 2024-01-05 PROCEDURE — 250N000013 HC RX MED GY IP 250 OP 250 PS 637: Performed by: ORTHOPAEDIC SURGERY

## 2024-01-05 RX ORDER — TRAMADOL HYDROCHLORIDE 50 MG/1
25-50 TABLET ORAL EVERY 6 HOURS PRN
Qty: 35 TABLET | Refills: 0 | Status: SHIPPED | DISCHARGE
Start: 2024-01-05

## 2024-01-05 RX ORDER — AMOXICILLIN 250 MG
1 CAPSULE ORAL DAILY
DISCHARGE
Start: 2024-01-05

## 2024-01-05 RX ORDER — POLYETHYLENE GLYCOL 3350 17 G/17G
17 POWDER, FOR SOLUTION ORAL DAILY
DISCHARGE
Start: 2024-01-05

## 2024-01-05 RX ORDER — GABAPENTIN 100 MG/1
CAPSULE ORAL
DISCHARGE
Start: 2024-01-05 | End: 2024-03-25

## 2024-01-05 RX ORDER — ACETAMINOPHEN 325 MG/1
650 TABLET ORAL EVERY 4 HOURS PRN
DISCHARGE
Start: 2024-01-05

## 2024-01-05 RX ORDER — METHOCARBAMOL 500 MG/1
500 TABLET, FILM COATED ORAL EVERY 6 HOURS PRN
Qty: 35 TABLET | DISCHARGE
Start: 2024-01-05

## 2024-01-05 RX ADMIN — ACETAMINOPHEN 975 MG: 325 TABLET, FILM COATED ORAL at 04:53

## 2024-01-05 RX ADMIN — POLYETHYLENE GLYCOL 3350 17 G: 17 POWDER, FOR SOLUTION ORAL at 08:20

## 2024-01-05 RX ADMIN — BUPROPION HYDROCHLORIDE 150 MG: 150 TABLET, EXTENDED RELEASE ORAL at 08:20

## 2024-01-05 RX ADMIN — METHOCARBAMOL 500 MG: 500 TABLET ORAL at 04:53

## 2024-01-05 RX ADMIN — SENNOSIDES AND DOCUSATE SODIUM 1 TABLET: 50; 8.6 TABLET ORAL at 08:20

## 2024-01-05 ASSESSMENT — ACTIVITIES OF DAILY LIVING (ADL)
ADLS_ACUITY_SCORE: 32

## 2024-01-05 NOTE — CARE PLAN
Shift: 0700 - 1000    Discharged to TCU    Vital Signs: Temp: 97.7  F (36.5  C) Temp src: Oral BP: 122/88 Pulse: 90   Resp: 17 SpO2: 97 % O2 Device: None (Room air)      CMS/Neuro: A&O x4      Cardio/Resp: No SOB or chest pain     Output: Continent of B/B - Passing flatus  LBM: 1/1/23     Activity: Assist x 1 (Minimal assistance needed) w/ walker   >>> SBA     Skin: L. Surgical hip incision - Covered w/ Aquacel  Posterior, Lower back incision - Covered w/ primapore      Pain: 6/10 - Controlled w/ scheduled & PRN medication  >>> Ice applied w/ relief     Dressing: L. Hip - Aquacel - CDI  Lower back - Primapore - CDI     LDA: R. Hand PIV - Removed by NST     Diet: Regular, Thin liquids, Good appetite, Medication whole  Ate 75% of breakfast      Additional Info: Call light w/in reach and able to make needs known  No PRNs given for pain this shift       Li Watson, RN, BSN, PHN

## 2024-01-05 NOTE — PROGRESS NOTES
Care Management Discharge Note    Discharge Date: 01/05/2024    Discharge Disposition: Transitional Care    Discharge Services: None    Discharge DME: None    Discharge Transportation: Agency    Private pay costs discussed: Transportation cost     Does the patient's insurance plan have a 3 day qualifying hospital stay waiver?  No    PAS Confirmation Code: [id number]  Patient/family educated on Medicare website which has current facility and service quality ratings: Yes    Education Provided on the Discharge Plan: Yes  Persons Notified of Discharge Plans: Yes  Patient/Family in Agreement with the Plan: Yes    Handoff Referral Completed: Yes    Additional Information:  Social work was able to obtain insurance auth this morning. Case # = [id number]. Authorization goes until 1/11. Social work called to set a ride up through [facility]. The only ride available was within the next 30 minutes. Stopped by patients room and asked if she was willing to get ready quickly and do this. Patient was motivated to do so. With the help of bedside RN and nursing aid, staff was able to get patient ready. IMM given by social work. Reached out to the facility to ensure this worked for them. They were on board with the plan. Orders sent to the facility. EHO completed.     Addendum 12:07 PM:  Orders resent as there was no PT/OT orders attached.     Accepted:  [address] [address]  P: [phone]  F: [phone]  1/4- Accepted to TCU.     No additional care management needs at this time.     KATHLEEN Turcios, LGSW  5 Med Surg and 10 ICU   [facility]  Phone: [phone]  Pager: [phone]

## 2024-01-05 NOTE — DISCHARGE SUMMARY
DISCHARGE SUMMARY    Pt discharging to: TCU   Transportation: Wheelchair to [facility].   AVS given and discussed: Yes, Pt verbalized understanding and all questions were answered.   Medications given: No, Medication info sent to facility   Belongings returned: Yes, belongings stayed w/ pt during the stay.  Comments: Pt was safely transported via wheelchair by [facility] without complication or concerns off the unit. NST removed R. Hand PIV. Personal belongings packed up and brought down to EMS transport via EMS .    Paperwork turned into paperwork bin at nursing station. Pt pharmacy box was emptied and items were placed in return to pharmacy bin. Social work faxing AVS to TCU facility. Discharge packet given to EMS.      Discharged by: Li Watson RN, BSN, PHN

## 2024-01-05 NOTE — TELEPHONE ENCOUNTER
[facility]    Phone Message    May a detailed message be left on voicemail: yes     Reason for Call: Other: Vashti the  with [facility] is calling to get clarification on prescription for patient tramadol. Two different orders were sent with different dosing instructions. Please call Vashti back at [phone].     Action Taken: Message routed to:  Other: [phone]    Travel Screening: Not Applicable

## 2024-01-05 NOTE — DISCHARGE SUMMARY
ORTHOPAEDIC DISCHARGE SUMMARY    Date of Admission: 1/2/2024  Date of Discharge: 1/5/2024  Disposition: Rehab  Staff Physician: Jones Hagen MD    DISCHARGE DIAGNOSIS:   See Op Note     PROCEDURES:    1/2/2024  C COMPUTER ASSISTED MUSCULOSKELETAL SURGICAL NAVIGATIONAL ORTHO PROC, IMAGE-GUIDED, FLUORO [0054T] (Percutaneous skeletal fixation of posterior pelvic bone fracture)  C COMPUTER ASSISTED MUSCULOSKELETAL SURGICAL NAVIGATIONAL ORTHO PROC, IMAGE-GUIDED, CT/MRI [0055T]  ID CPTR-ASST SURGICAL NAVIGATION IMAGE-LESS [57961]  ID ARTHRODESIS SI JT, PRQ W/O TRANSFIX DEVICE [15680]  ID ARTHRODESIS SACROILIAC JOINT PERCUTANEOUS [82995]  ID ARTHRODESIS SACROILIAC JOINT W/OBTAINING GRAFT [75828]   Procedures:    * Percutaneous skeletal fixation of posterior pelvic bone fracture       HOSPITAL COURSE:   78 year old female with sacral insufficiency fracture.  Patient elected to undergo the procedures listed above on 1/2/2024 after having treatment options, alternatives, risks, and benefits explained.       Pre-operative antibiotics were given and continued intra-operatively as well as post-operatively.  Hospital course without complication.   At time of discharge, patient was tolerating PO pain control, voiding, and eating a pre-operative diet.  Patient was discharged to TCU.    FOLLOW UP:      Follow up with Dr. Jones Hagen MD    Future Appointments   Date Time Provider Department Center   1/5/2024 10:45 AM Aissatou Lackey OT UROT [place]   1/5/2024  3:30 PM Bess Overton, PT URPT [place]   2/13/2024  1:30 PM Jacquelin Melendrez PA-C [facility]   3/21/2024 10:30 AM Jones Hagen MD [facility] ORTHOPEDICS - Located 4th Floor (4D ) in the Clinics and Surgery Center at [address] [place] [address].  parking is very convenient and highly recommended.  is a $7 flat rate fee; no tips accepted. Both  and self parkers should enter the main arrival plaza from [facility]; parking  "attendants will direct you based on your parking preference.     Department Address: [address] [place] [address] [address] [facility] [address]     [facility] Orthopedic Scheduling contact number: [phone]    Call if you haven't heard regarding these appointments within 7 days of discharge.      PHYSICAL EXAMINATION:  General: No acute distress  Vital Signs: /85 (BP Location: Left arm)   Pulse 86   Temp 97.8  F (36.6  C) (Oral)   Resp 18   Ht 1.6 m (5' 3\")   Wt 58.2 kg (128 lb 4.9 oz)   SpO2 96%   BMI 22.73 kg/m        Pulmonary:  Nonlabored  Cardiovascular:  Intact distal pulses, capillary refill <3s  Musculoskeletal:  Neuro intact 5/5 BLE. Dressing CDI            Discharge Procedure Orders   Discharge Instructions   Order Comments: Review outpatient procedure discharge instructions as directed by Provider.     Discharge Instructions - Change dressing   Order Comments: Wash hands prior to changing dressing daily. If no drainage on dressing, may leave open to air.     No driving or operating machinery while in a cervical collar   Order Comments: Until follow-up appointment.     Notify Provider   Order Comments: Signs and symptoms of infection: Fever greater than 101, redness, swelling, heat at site, drainage, or pus     Discharge Instructions - Shower with incision NOT covered   Order Comments: You may shower 3 days after surgery.  You do not need to cover your surgical incision in the shower and may allow water and soap to run over top of the incision. Do not soak or submerge the incision underwater.     Return to Clinic   Order Comments: Return to clinic 6 wks     Reason for your hospital stay   Order Comments: SI joint fusion     Brief Discharge Instructions   Order Comments: Post-operative Discharge Instructions:     WOUND CARE:  You have a dressing on your incision which can be worn for up to 5 days, and it can be worn in the shower. After the dressing has been removed, you do not need a " "dressing. There is \"surgical glue\" directly over the incision. This will fall off on its own, which can take up to 2 weeks. It is okay to shower and wash gently with soap and water. Do not soak in the bath. No pools, hot tubs, or lakes for 6 weeks.      After surgery, you may have a sensitive scar.  When the incision has fully healed, you may massage the scar to decrease sensitivity and help break down scar tissue. Do this up to 4 times per day.     DIET & EXERCISE:  - When you get home, you may resume your normal diet as tolerated. You may not be very hungry but try to eat small healthy meals to help you heal. Remember to drink plenty of water/fluids to help keep you hydrated.     - You will be seen in the clinic at 6 weeks following surgery. You will not need to attend physical therapy during this time. You can focus on cardiovascular fitness by walking as much as you can tolerate. Avoid bending, lifting, and twisting. Your weight lifting restriction is 10 pounds until your first follow-up appointment.       PAIN MEDICATIONS:  For postoperative pain control, we have prescribed a variety of medications. Tylenol has been prescribed and should be taken as part of the complete pain management regimen. You may also have been prescribed a muscle relaxer to be taken as needed for muscle spasms. Other pain management techniques include icing the surgical area (for 15 minutes on, 15 minutes off) throughout the day to help with inflammation. Avoid NSAIDs (ibuprofen, Aleve, Advil, etc) for the first 12 weeks after surgery, unless approved by your surgeon.     You also received a prescription for a opioid medication.  This should be taken for the first few weeks following surgery.  As pain improves, decrease the amount you take (see tapering plan below). Opioid have numerous side effects including nausea, constipation, and drowsiness. If you experience nausea, this may be relieved by taking the medication with food or a light " "meal. To avoid constipation, please use an over-the-counter stool softeners and drink lots of water and eat fruits and vegetables. No operating heavy machinery or driving an automobile while on opioid medications.        Tapering opioids: As your pain symptoms improve, focus your efforts on decreasing (tapering) use of opioid medications. The most successful tapering strategy is to first, decrease the number of tablets you take every 4-6 hours to the minimum prescribed. Then, increase the amount of time between doses.  For example:  First, taper to   or 1 tablet every 4-6 hours.  Then, taper to   or 1 tablet every 6-8 hours.  Then, taper to   or 1 tablet every 8-10 hours.  Then, taper to   or 1 tablet every 10-12 hours.  Then, taper to   or 1 tablet at bedtime.  The bedtime dose can help with comfort during sleep and is typically the last dose to be discontinued after surgery.     Call the orthopedic clinic at [phone] several business days in advance of when you need a refill. Early refills will not be provided, and you may not take more than what is prescribed. Inform the nurse how much of the medication you are taking and how many tablets you have left.     WHEN TO CALL:  Please call or return if you experience the following:  - Fevers (temperature greater than 100.4 degrees Fahrenheit).  - Pain that is getting worse or does not respond to pain medications.  - Drainage from your wound.  - Increasing redness about the wound.  - Changes in strength or sensation to your arms/legs.   - Any other worrisome symptoms.     You may reach the clinic by dialing [phone].  After hours, you may reach the resident on call by dialing [phone].      When to call - Contact Surgeon Team   Order Comments: You may experience symptoms that require follow-up before your scheduled appointment. Refer to the \"Stoplight Tool\" for instructions on when to contact your Surgeon Team if you are concerned about pain control, blood " clots, constipation, or if you are unable to urinate.     When to call - Reach out to Urgent Care   Order Comments: If you are not able to reach your Surgeon Team and you need immediate care, go to the Orthopedic Walk-in Clinic or Urgent Care at your Surgeon's office.  Do NOT go to the Emergency Room unless you have shortness of breath, chest pain, or other signs of a medical emergency.     When to call - Reasons to Call 911   Order Comments: Call 911 immediately if you experience sudden-onset chest pain, arm weakness/numbness, slurred speech, or shortness of breath     Discharge Instruction - Breathing exercises   Order Comments: Perform breathing exercises using your Incentive Spirometer 10 times per hour while awake for 2 weeks.     Symptoms - Fever Management   Order Comments: A low grade fever can be expected after surgery.  Use acetaminophen (TYLENOL) as needed for fever management.  Contact your Surgeon Team if you have a fever greater than 101.5 F, chills, and/or night sweats.     Symptoms - Constipation management   Order Comments: Constipation (hard, dry bowel movements) is expected after surgery due to the combination of being less active, the anesthetic, and the opioid pain medication.  You can do the following to help reduce constipation:  ~  FLUIDS:  Drink clear liquids (water or Gatorade), or juice (apple/prune).  ~  DIET:  Eat a fiber rich diet.    ~  ACTIVITY:  Get up and move around several times a day.  Increase your activity as you are able.  MEDICATIONS:  Reduce the risk of constipation by starting medications before you are constipated.  You can take Miralax   (1 packet as directed) and/or a stool softener (Senokot 1-2 tablets 1-2 times a day).  If you already have constipation and these medications are not working, you can get magnesium citrate and use as directed.  If you continue to have constipation you can try an over the counter suppository or enema.  Call your Surgeon Team if it has been  greater than 3 days since your last bowel movement.     Symptoms - Reduced Urine Output   Order Comments: Changes in the amount of fluids you drank before and after surgery may result in problems urinating.  It is important to stay well-hydrated after surgery and drink plenty of water. If it has been greater than 8 hours since you have urinated despite drinking plenty of water, call your Surgeon Team.     Activity - Exercises to prevent blood clots   Order Comments: Unless otherwise directed by your Surgeon team, perform the following exercises at least three times per day for the first four weeks after surgery to prevent blood clots in your legs: 1) Point and flex your feet (Ankle Pumps), 2) Move your ankle around in big circles, 3) Wiggle your toes, 4) Walk, even for short distances, several times a day, will help decrease the risk of blood clots.     Order Specific Question Answer Comments   Is discharge order? Yes      Comfort and Pain Management - Pain after Surgery   Order Comments: Pain after surgery is normal and expected.  You will have some amount of pain for several weeks after surgery.  Your pain will improve with time.  There are several things you can do to help reduce your pain including: rest, ice, elevation, and using pain medications as needed. Contact your Surgeon Team if you have pain that persists or worsens after surgery despite rest, ice, elevation, and taking your medication(s) as prescribed. Contact your Surgeon Team if you have new numbness, tingling, or weakness in your operative extremity.     Comfort and Pain Management - Swelling after Surgery   Order Comments: Swelling and/or bruising of the surgical extremity is common and may persist for several months after surgery. In addition to frequent icing and elevation, gentle compressive support with an ACE wrap or tubigrip may help with swelling. Apply compression regularly, removing at least twice daily to perform skin checks. Contact your  Surgeon Team if your swelling increases and is NOT associated with an increase in your activity level, or if your swelling increases and is associated with redness and pain.     Comfort and Pain Management - Cold therapy   Order Comments: Ice can be used to control swelling and discomfort after surgery. Place a thin towel over your operative site and apply the ice pack overtop. Leave ice pack in place for 20 minutes, then remove for 20 minutes. Repeat this 20 minutes on/20 minutes off routine as often as tolerated.     Medication Instructions - Acetaminophen (TYLENOL) Instructions   Order Comments: You were discharged with acetaminophen (TYLENOL) for pain management after surgery. Acetaminophen most effectively manages pain symptoms when it is taken on a schedule without missing doses (every four, six, or eight hours). Your Provider will prescribe a safe daily dose between 3000 - 4000 mg.  Do NOT exceed this daily dose. Most patients use acetaminophen for pain control for the first four weeks after surgery.  You can wean from this medication as your pain decreases.     Medication Instructions - NSAID Instructions   Order Comments: Do not use NSAIDs x 12 weeks. Some common anti-inflammatories include: ibuprofen (ADVIL, MOTRIN), naproxen (ALEVE, NAPROSYN), celecoxib (CELEBREX), meloxicam (MOBIC), ketorolac (TORADOL).     Medication Instructions - Opioids - Tapering Instructions   Order Comments: In the first three days following surgery, your symptoms may warrant use of the narcotic pain medication every four to six hours as prescribed. This is normal. As your pain symptoms improve, focus your efforts on decreasing (tapering) use of narcotic medications. The most successful tapering strategy is to first, decrease the number of tablets you take every 4-6 hours to the minimum prescribed. Then, increase the amount of time between doses.  For example:  First, taper to   or 1 tablet every 4-6 hours.  Then, taper to   or 1  tablet every 6-8 hours.  Then, taper to   or 1 tablet every 8-10 hours.  Then, taper to   or 1 tablet every 10-12 hours.  Then, taper to   or 1 tablet at bedtime.  The bedtime dose can help with comfort during sleep and is typically the last dose to be discontinued after surgery.     Follow Up Care   Order Comments: Follow-up with your Surgeon Team in 6 weeks for wound check.     Medication instructions - No pharmacologic VTE prophylaxis prescribed   Order Comments: Your Surgeon did not prescribe medication for anticoagulation.     Medication Instructions - Opioid Instructions (0.5 - 1 tablet Q 4-6 hours, MAX 4 tablets)   Order Comments: You were discharged with an opioid medication (hydromorphone, oxycodone, hydrocodone, or tramadol). This medication should only be taken for breakthrough pain that is not controlled with acetaminophen (TYLENOL). If you rate your pain less than 3 you do not need this medication.  Pain rating 0-3:  You do not need this medication  Pain rating 4-6:  Take 1/2 tablet every 4-6 hours as needed  Pain rating 7-10:  Take 1 tablet every 4-6 hours as needed  Do not exceed 4 tablets per day     Discharge Instruction - Regular Diet Adult   Order Comments: Return to your pre-surgery diet unless instructed otherwise     Order Specific Question Answer Comments   Is discharge order? Yes        Jorge Wade MD  Orthopaedic Surgery

## 2024-01-05 NOTE — PLAN OF CARE
Problem: Adult Inpatient Plan of Care  Goal: Plan of Care Review  Description: The Plan of Care Review/Shift note should be completed every shift.  The Outcome Evaluation is a brief statement about your assessment that the patient is improving, declining, or no change.  This information will be displayed automatically on your shift  note.  Outcome: Progressing  Goal: Optimal Comfort and Wellbeing  Outcome: Progressing  Intervention: Monitor Pain and Promote Comfort  Recent Flowsheet Documentation  Taken 1/5/2024 0538 by Brent Parks RN  Pain Management Interventions: medication (see MAR)  Taken 1/5/2024 0453 by Brent Parks RN  Pain Management Interventions: medication (see MAR)  Goal: Readiness for Transition of Care  Outcome: Progressing  Flowsheets (Taken 1/5/2024 0638)  Anticipated Changes Related to Illness: none  Transportation Anticipated:   agency   family or friend will provide  Concerns to be Addressed: discharge planning  Intervention: Mutually Develop Transition Plan  Recent Flowsheet Documentation  Taken 1/5/2024 0638 by Brent Parks RN  Anticipated Changes Related to Illness: none  Transportation Anticipated:   agency   family or friend will provide  Concerns to be Addressed: discharge planning   Goal Outcome Evaluation:             No acute changes this shift. Pt. Slept for majority of the shift. Pain medication given during shift. Continue POC.

## 2024-01-05 NOTE — PLAN OF CARE
Occupational Therapy Discharge Summary    Reason for therapy discharge:    Discharged to transitional care facility.    Progress towards therapy goal(s). See goals on Care Plan in [facility] electronic health record for goal details.  Goals partially met.  Barriers to achieving goals:   discharge from facility.    Therapy recommendation(s):    Continued therapy is recommended.  Rationale/Recommendations:  recommend continued OT to progress IND and safety with ADLs/IADLs within precautions.

## 2024-01-05 NOTE — CARE PLAN
Observation Goals: Met - Discharged to TCU    Vital signs & Mental status at baseline: YES  Temp: 97.7  F (36.5  C) - Oral  BP: 122/88   Pulse: 90  Resp: 17  SpO2: 97% on RA    PO intake tolerated (at least 100 mls fluid, at least 25% of a snack or meal, and PO intake on at least two separate occasions): YES    Able to ambulate at least 30 feet: YES, w/ nursing staff    Must void prior to discharge unless kirk inserted per neurosurgery bladder management algorithm: YES, voiding spontaneously     Adequate pain control using PO analgesics: YES    Hypercapnia, hypoventilation, or hypoxia resolved for at least 2 hours w/out supplemental oxygen: YES, 97% on RA    Deficits in sensation, mobility, or coordination have resolved if spinal or regional anesthesia was used: YES, Sarah N/T    Li Watson RN on 1/5/2024 at 11:41 AM

## 2024-01-05 NOTE — PLAN OF CARE
Physical Therapy Discharge Summary    Reason for therapy discharge:    Discharged to transitional care facility.    Progress towards therapy goal(s). See goals on Care Plan in [facility] electronic health record for goal details.  Goals partially met.  Barriers to achieving goals:   discharge from facility.    Therapy recommendation(s):    Continued therapy is recommended.  Rationale/Recommendations:  continue w/ progression towards PT goals w/ TCU.

## 2024-01-05 NOTE — TELEPHONE ENCOUNTER
See phone message from Nursing home.  I called back & spoke to staff. Pt transferred to their Rehab facility today from hospital & she had 2 different directions on Tramadol 50mg order Q8H or Q6H.   I told her the directions in epic state half tab to 1 tab Q6H prn.  Reviewed postop appts dates.  Call back prn. Staff agreed.  Delroy DONOVAN/Sherie Lomas RN.

## 2024-01-29 ENCOUNTER — MYC MEDICAL ADVICE (OUTPATIENT)
Dept: ORTHOPEDICS | Facility: CLINIC | Age: 79
End: 2024-01-29

## 2024-01-29 DIAGNOSIS — M84.454K INSUFFICIENCY FRACTURE OF PELVIS WITH NONUNION, SUBSEQUENT ENCOUNTER: ICD-10-CM

## 2024-02-06 DIAGNOSIS — M84.48XK: Primary | ICD-10-CM

## 2024-02-09 NOTE — PROGRESS NOTES
I have reviewed and updated the patient's Past Medical History, Social History, Family History and Medication List.    ALLERGIES  Patient has no known allergies.    Spine Surgery Follow Up    REFERRING PHYSICIAN: No ref. provider found   PRIMARY CARE PHYSICIAN: Lizzie Ruelas           Chief Complaint:   RECHECK (POST-OP SPINE )      History of Present Illness:  Symptom Profile Including: location of symptoms, onset, severity, exacerbating/alleviating factors, previous treatments:        Garth Montgomery is a 78 year old female who presents today for routine 6 week follow up s/p Percutaneous fixation of pelvic fracture, MIS left SI joint fusion (DOS 1/2/24 with Dr. Hagen) for Sacral insufficiency fracture. Today, she presents to clinic with her daughter-in-law.  She reports that overall things are improving.  She is happy with surgery.  She says surgery took away her severe left low back pain and muscle spasms.  Before surgery she was barely able to stand, but now she is able to walk.  She uses a wheeled walker when out of the house, but is starting to walk without use of assistive devices in her home occasionally.  She continues to work with physical therapy.  She does have some left lateral hip pain which is worse with sitting.  She also feels numbness and tingling radiating from left buttock to the back of the knee.    RENZO Scores:  Oswestry (RENZO) Questionnaire        2/12/2024    12:02 PM   OSWESTRY DISABILITY INDEX   Count 10   Sum 20   Oswestry Score (%) 40 %      Pre-op: 60%  6wk: 40%         Physical Exam:    Constitutional - Patient is healthy, well-nourished and appears stated age   Respiratory - Patient is breathing normally and in no respiratory distress.   Skin - No suspicious rashes or lesions.   Psychiatric - Normal mood and affect.   Cardiovascular - Extremities warm and well perfused.   Eyes - Visual acuity is normal to the written word.   ENT - Hearing intact to the spoken word.   GI - No abdominal  distention.   Musculoskeletal - Non-antalgic gait with use of wheeled walker. Unable to attempted seated piriformis stretch due to limited hip ROM. Pain to palpation of left greater trochanter and left piriformis muscle.  Well healed surgical incisions.       LOWER EXTREMITY Left Right   Hip flexion 5/5 5/5   Knee flexion 5/5 5/5   Knee extension 5/5 5/5   Ankle dorsiflexion 5/5 5/5   Ankle plantarflexion 5/5 5/5   Great toe extension 5/5 5/5                Imaging:   I ordered and independently reviewed new radiographs at this clinic visit. The results were discussed with the patient.  Findings include:    2/13/2024 XR pelvis 3 view: Stable appearance of left SI joint instrumentation without evidence of loosening, fracture or migration. Stable appearance of lumbar degenerative changes and scoliosis.             Assessment and Plan:   Assessment:  78 year old female who presents today for routine 6 week follow up s/p Percutaneous fixation of pelvic fracture, MIS left SI joint fusion (DOS 1/2/24 with Dr. Hagen) for Sacral insufficiency fracture; progressing as expected. Left piriformis syndrome      Plan:  Reviewed today's XR images which demonstrate stable instrumentation without evidence of hardware failure.  Congratulated patient on her postoperative recovery.  Encouraged her to continue working with physical therapy on lower extremity strengthening and gait training.  They can instruct her on when she can safely wean off wheeled walker. I also showed her picture of piriformis muscle and discussed piriformis syndrome with patient. Order for PT to work on stretching exercises that she can tolerate with her limited hip ROM. Next visit will be routine 3 month post-op.    - Ordered for PT to add piriformis stretching exercises  - Follow up at routine 3 month post-op with repeat XR pelvis AP/lateral/roman views.      Respectfully,  Jacquelin Melendrez (yuly Hammonds), PAMATTEO  Orthopaedic Spine Surgery  Dept Orthopaedic  Surgery, [facility]  [facility] [place] Phone: [phone]    Dictation Disclaimer: Some of this Note has been completed with voice-recognition dictation software. Although errors are generally corrected real-time, there is the potential for a rare error to be present in the completed chart.

## 2024-02-13 ENCOUNTER — ANCILLARY PROCEDURE (OUTPATIENT)
Dept: GENERAL RADIOLOGY | Facility: CLINIC | Age: 79
End: 2024-02-13
Attending: PHYSICIAN ASSISTANT
Payer: COMMERCIAL

## 2024-02-13 ENCOUNTER — OFFICE VISIT (OUTPATIENT)
Dept: ORTHOPEDICS | Facility: CLINIC | Age: 79
End: 2024-02-13
Payer: COMMERCIAL

## 2024-02-13 DIAGNOSIS — M84.48XK: ICD-10-CM

## 2024-02-13 DIAGNOSIS — Z98.1 S/P FUSION OF SACROILIAC JOINT: Primary | ICD-10-CM

## 2024-02-13 DIAGNOSIS — G57.02 PIRIFORMIS SYNDROME OF LEFT SIDE: ICD-10-CM

## 2024-02-13 PROCEDURE — 72190 X-RAY EXAM OF PELVIS: CPT | Performed by: RADIOLOGY

## 2024-02-13 PROCEDURE — 99024 POSTOP FOLLOW-UP VISIT: CPT | Performed by: PHYSICIAN ASSISTANT

## 2024-02-13 NOTE — LETTER
2/13/2024         RE: Garth Montgomery  [address]  [place] [address]        Dear Colleague,    Thank you for referring your patient, Garth Montgomery, to the [facility] [place]. Please see a copy of my visit note below.    I have reviewed and updated the patient's Past Medical History, Social History, Family History and Medication List.    ALLERGIES  Patient has no known allergies.    Spine Surgery Follow Up    REFERRING PHYSICIAN: No ref. provider found   PRIMARY CARE PHYSICIAN: Lizzie Ruelas           Chief Complaint:   RECHECK (POST-OP SPINE )      History of Present Illness:  Symptom Profile Including: location of symptoms, onset, severity, exacerbating/alleviating factors, previous treatments:        Garth Montgomery is a 78 year old female who presents today for routine 6 week follow up s/p Percutaneous fixation of pelvic fracture, MIS left SI joint fusion (DOS 1/2/24 with Dr. Hagen) for Sacral insufficiency fracture. Today, she presents to clinic with her daughter-in-law.  She reports that overall things are improving.  She is happy with surgery.  She says surgery took away her severe left low back pain and muscle spasms.  Before surgery she was barely able to stand, but now she is able to walk.  She uses a wheeled walker when out of the house, but is starting to walk without use of assistive devices in her home occasionally.  She continues to work with physical therapy.  She does have some left lateral hip pain which is worse with sitting.  She also feels numbness and tingling radiating from left buttock to the back of the knee.    RENZO Scores:  Oswestry (RENZO) Questionnaire        2/12/2024    12:02 PM   OSWESTRY DISABILITY INDEX   Count 10   Sum 20   Oswestry Score (%) 40 %      Pre-op: 60%  6wk: 40%         Physical Exam:    Constitutional - Patient is healthy, well-nourished and appears stated age   Respiratory - Patient is breathing normally and in no respiratory  distress.   Skin - No suspicious rashes or lesions.   Psychiatric - Normal mood and affect.   Cardiovascular - Extremities warm and well perfused.   Eyes - Visual acuity is normal to the written word.   ENT - Hearing intact to the spoken word.   GI - No abdominal distention.   Musculoskeletal - Non-antalgic gait with use of wheeled walker. Unable to attempted seated piriformis stretch due to limited hip ROM. Pain to palpation of left greater trochanter and left piriformis muscle.  Well healed surgical incisions.       LOWER EXTREMITY Left Right   Hip flexion 5/5 5/5   Knee flexion 5/5 5/5   Knee extension 5/5 5/5   Ankle dorsiflexion 5/5 5/5   Ankle plantarflexion 5/5 5/5   Great toe extension 5/5 5/5                Imaging:   I ordered and independently reviewed new radiographs at this clinic visit. The results were discussed with the patient.  Findings include:    2/13/2024 XR pelvis 3 view: Stable appearance of left SI joint instrumentation without evidence of loosening, fracture or migration. Stable appearance of lumbar degenerative changes and scoliosis.             Assessment and Plan:   Assessment:  78 year old female who presents today for routine 6 week follow up s/p Percutaneous fixation of pelvic fracture, MIS left SI joint fusion (DOS 1/2/24 with Dr. Hagen) for Sacral insufficiency fracture; progressing as expected. Left piriformis syndrome      Plan:  Reviewed today's XR images which demonstrate stable instrumentation without evidence of hardware failure.  Congratulated patient on her postoperative recovery.  Encouraged her to continue working with physical therapy on lower extremity strengthening and gait training.  They can instruct her on when she can safely wean off wheeled walker. I also showed her picture of piriformis muscle and discussed piriformis syndrome with patient. Order for PT to work on stretching exercises that she can tolerate with her limited hip ROM. Next visit will be routine 3  month post-op.    - Ordered for PT to add piriformis stretching exercises  - Follow up at routine 3 month post-op with repeat XR pelvis AP/lateral/roman views.      Respectfully,  Jacquelin Melendrez (yuly Hammonds), PAPenelopeC  Orthopaedic Spine Surgery  Dept Orthopaedic Surgery, [facility]  [facility] [place] Phone: [phone]    Dictation Disclaimer: Some of this Note has been completed with voice-recognition dictation software. Although errors are generally corrected real-time, there is the potential for a rare error to be present in the completed chart.

## 2024-03-08 DIAGNOSIS — Z98.1 S/P FUSION OF SACROILIAC JOINT: Primary | ICD-10-CM

## 2024-03-25 RX ORDER — GABAPENTIN 100 MG/1
CAPSULE ORAL
Qty: 30 CAPSULE | Refills: 1 | Status: SHIPPED | OUTPATIENT
Start: 2024-03-25 | End: 2024-06-20

## 2024-03-29 ENCOUNTER — ANCILLARY PROCEDURE (OUTPATIENT)
Dept: GENERAL RADIOLOGY | Facility: CLINIC | Age: 79
End: 2024-03-29
Attending: ORTHOPAEDIC SURGERY
Payer: COMMERCIAL

## 2024-03-29 ENCOUNTER — OFFICE VISIT (OUTPATIENT)
Dept: ORTHOPEDICS | Facility: CLINIC | Age: 79
End: 2024-03-29
Payer: COMMERCIAL

## 2024-03-29 VITALS — HEIGHT: 61 IN | WEIGHT: 132 LBS | BODY MASS INDEX: 24.92 KG/M2

## 2024-03-29 DIAGNOSIS — G57.02 PIRIFORMIS SYNDROME OF LEFT SIDE: ICD-10-CM

## 2024-03-29 DIAGNOSIS — M84.48XK: Primary | ICD-10-CM

## 2024-03-29 PROCEDURE — 72190 X-RAY EXAM OF PELVIS: CPT | Performed by: RADIOLOGY

## 2024-03-29 PROCEDURE — 99024 POSTOP FOLLOW-UP VISIT: CPT | Performed by: PHYSICIAN ASSISTANT

## 2024-03-29 RX ORDER — METHOCARBAMOL 500 MG/1
250-500 TABLET, FILM COATED ORAL 4 TIMES DAILY PRN
Qty: 60 TABLET | Refills: 0 | Status: SHIPPED | OUTPATIENT
Start: 2024-03-29

## 2024-03-29 NOTE — PROGRESS NOTES
"  Spine Surgery Post-Op Visit    Subjective:  Garth Montgomery is a 78 year old female who is 3 months s/p Percutaneous fixation of pelvic fracture, MIS left SI joint fusion (DOS 1/2/24 with Dr. Hagen) for Sacral insufficiency fracture. She still has pain in the left buttock which can radiate down the anterior and posterior thigh to the level of the knee. It does not typically prevent her doing activities but is more significant in the evenings. She is doing some exercises at a Religious and has PT scheduled next week. Uses cane for assistive device. For pain takes APAP BID, tramadol 1-2 50 mg daily, gabapentin 100 mg hs (just started so unsure of efficacy). She is evenity for osteoporosis.     Oswestry (RENZO) Questionnaire        2/12/2024    12:02 PM   OSWESTRY DISABILITY INDEX   Count 10   Sum 20   Oswestry Score (%) 40 %   Pre-op 60%    Visual Analog Scale (VAS) Questionnaire        3/29/2024     8:25 AM   VISUAL ANALOG PAIN SCALE   Back Pain Scale 0-10 3   Right leg pain 0   Left leg pain 6   Neck Pain Scale 0-10 0   Right arm pain 0   Left arm pain 0        Physical Exam:  Vitals: Ht 1.549 m (5' 1\")   Wt 59.9 kg (132 lb)   BMI 24.94 kg/m    Constitutional: Patient is healthy, well-nourished and appears stated age.  Respiratory: Patient is breathing normally and in no respiratory distress.  Skin: Incision well healed, no evidence of wound dehiscence or erythema.   Gait: in wheelchair, able to get up from chair using arms to push off.   Musculoskeletal: able to perform single leg stance but weakness on the left compared to right.  Pain across piriformis, piriformis stretch test positive on the left.   Pain to palpation of SI joint, no lumbar tenderness.     Imaging:  We independently reviewed and interpreted the following imaging at this clinic visit which were also reviewed with patient  Xrays 3 views pelvis 3/29/2024  Postoperative changes of left-sided SI joint fusion with 2 Torq screws and a iliac-sacral-iliac " screw across the S2 segment.  No evidence of hardware loosening or failure or failure.  Degenerative changes and scoliosis of the lumbar spine.    Assessment:  78 year old female 3 months s/p Percutaneous fixation of pelvic fracture, MIS left SI joint fusion (DOS 1/2/24 with Dr. Hagen) for sacral insufficiency fracture. Left piriformis pain. Osteoporosis, treated with evenity.     Plan:  Start PT  Showed patient and daughter pictures on google search of piriformis syndrome and showed stretching exercises.  Ok to start NSAIDs  Restart methocarbamol, S/E discussed.     If buttock pain not improving with 6 weeks of PT, will refer her for left piriformis trigger point injections. They can contact us through [employer] or phone call for this referral. They would likely prefer to go someplace locally and we discussed that they may have to call around to pain or PMR clinics to find someone who can perform this.    Follow up in 3 months with same XR as today with Dr. Hagen.     The patient was shown their own imaging and all questions were answered.    Thank you for allowing me to be a part of this patient's care.     Ning Cline PA-C   Orthopedic Spine Surgery

## 2024-03-29 NOTE — PATIENT INSTRUCTIONS
Start PT.    Consider left piriformis trigger point injections if the buttock pain is not improving.     Prescription for robaxin was sent. You can start taking NSAIDs such ibuprofen or naproxen.

## 2024-05-15 ENCOUNTER — TRANSFERRED RECORDS (OUTPATIENT)
Dept: HEALTH INFORMATION MANAGEMENT | Facility: CLINIC | Age: 79
End: 2024-05-15

## 2024-05-28 ENCOUNTER — TRANSFERRED RECORDS (OUTPATIENT)
Dept: HEALTH INFORMATION MANAGEMENT | Facility: CLINIC | Age: 79
End: 2024-05-28
Payer: COMMERCIAL

## 2024-06-20 DIAGNOSIS — M84.454K INSUFFICIENCY FRACTURE OF PELVIS WITH NONUNION, SUBSEQUENT ENCOUNTER: ICD-10-CM

## 2024-06-20 DIAGNOSIS — M84.454K INSUFFICIENCY FRACTURE OF PELVIS WITH NONUNION, SUBSEQUENT ENCOUNTER: Primary | ICD-10-CM

## 2024-06-20 RX ORDER — GABAPENTIN 100 MG/1
CAPSULE ORAL
Qty: 30 CAPSULE | Refills: 0 | Status: SHIPPED | OUTPATIENT
Start: 2024-06-20 | End: 2024-08-01

## 2024-06-26 ENCOUNTER — ANCILLARY PROCEDURE (OUTPATIENT)
Dept: GENERAL RADIOLOGY | Facility: CLINIC | Age: 79
End: 2024-06-26
Attending: ORTHOPAEDIC SURGERY
Payer: COMMERCIAL

## 2024-06-26 ENCOUNTER — OFFICE VISIT (OUTPATIENT)
Dept: ORTHOPEDICS | Facility: CLINIC | Age: 79
End: 2024-06-26
Payer: COMMERCIAL

## 2024-06-26 VITALS — HEIGHT: 61 IN | WEIGHT: 132 LBS | BODY MASS INDEX: 24.92 KG/M2

## 2024-06-26 DIAGNOSIS — M16.12 ARTHRITIS OF LEFT HIP: ICD-10-CM

## 2024-06-26 DIAGNOSIS — M84.454K INSUFFICIENCY FRACTURE OF PELVIS WITH NONUNION, SUBSEQUENT ENCOUNTER: Primary | ICD-10-CM

## 2024-06-26 PROCEDURE — 99214 OFFICE O/P EST MOD 30 MIN: CPT | Performed by: ORTHOPAEDIC SURGERY

## 2024-06-26 PROCEDURE — 72190 X-RAY EXAM OF PELVIS: CPT | Performed by: RADIOLOGY

## 2024-06-26 NOTE — PROGRESS NOTES
Rayne returns today for follow-up along with her daughter-in-law.  She has ongoing left groin/buttock pain.  She has been seen by couple of pain providers who have done piriformis block and a greater troches injection which really did not relieve her symptoms.  She does think that maybe we have improved her overall function a little bit after fixing her pelvis fracture.  Very importantly she has been on Evenity since November.    Of note in addition to her sacral insufficiency fracture that she initially presented with there was a question of a pedicle fracture and she does have a significant scoliosis with degenerative component on top of this.    Visual analog pain scale is a 5 for left hip pain primary location is the hip.  Oswestry score is 49%.    Objective: Physical exam reveals an alert pleasant female appearing her stated age.  Her stance shows some flat back posture and an obvious scoliotic deformity.  On palpation she is tender over her greater trochanter and a little bit her ischial tuberosity.  Seated internal rotation of her left hip gives her significant reproduction of her pain.    Imaging: AP lateral and Mcelroy views of the pelvis are obtained.  I have independently ordered and interpreted these imaging studies today.  The instrumentation is in place there is no evidence of screw loosening.  Of note she does have significant inferomedial osteophytic spurring and bilateral hips.          Assessment/Plan: Sacral insufficiency fracture stable post fixation and treatment of the osteoporosis with apparent good response.  Hip pain most likely hip osteoarthritic change.  Will refer her to [facility] either Dr. Masterson or Dr. Oliveira.  I called and spoke to Dr. Aleman who originally referred her to me and asked who his partners aware that her doing hip replacements.  He put a message into the system to have them reach out to contact her to set up an appointment for evaluation for possible total hip  arthroplasty.  Her scoliosis appears stable.  What I have recommended to her is that we see about getting her hip treated.  If this takes care of her problem then were done if not then we may need to readdress her scoliotic deformity down the road.  She thinks this plan makes sense and is in agreement with that and we will move forward with that.    My total contact time on the day of visit for this visit including image ordering, image interpretation, face-to-face evaluation, telephone conversation with a colleague to coordinate care and documentation was greater than 30 minutes.    Jones Hagen MD

## 2024-06-26 NOTE — NURSING NOTE
"Reason For Visit:   Chief Complaint   Patient presents with    RECHECK     RETURN SURGICAL SPINE - DOS 1-2-24 Percutaneous Pelvix Fixation       Primary MD: Lizzie Ruelas  Ref. MD: Dr. Aleman     ?  No  Occupation retired.     Date of injury: 8/2023  Type of injury: Fell in bath tub, spring fell in driveway     Date of surgery: No  Type of surgery: No.     Smoker: No  Request smoking cessation information: No    Ht 1.537 m (5' 0.5\")   Wt 59.9 kg (132 lb)   BMI 25.36 kg/m      Pain Assessment  Patient Currently in Pain: Yes  0-10 Pain Scale: 5 (left hip pain)  Primary Pain Location: Hip    Oswestry (RENZO) Questionnaire        6/26/2024     9:07 AM   OSWESTRY DISABILITY INDEX   Count 9   Sum 22   Oswestry Score (%) 48.89 %            Neck Disability Index (NDI) Questionnaire         No data to display                       Visual Analog Pain Scale  Back Pain Scale 0-10: 0  Right leg pain: 0  Left leg pain: 3 (left hip pain also pain from hip to back of knee)  Neck Pain Scale 0-10: 0  Right arm pain: 0  Left arm pain: 0    Promis 10 Assessment        6/26/2024     9:09 AM   PROMIS 10   In general, would you say your health is: Fair   In general, would you say your quality of life is: Fair   In general, how would you rate your physical health? Fair   In general, how would you rate your mental health, including your mood and your ability to think? Fair   In general, how would you rate your satisfaction with your social activities and relationships? Fair   In general, please rate how well you carry out your usual social activities and roles Fair   To what extent are you able to carry out your everyday physical activities such as walking, climbing stairs, carrying groceries, or moving a chair? A little   In the past 7 days, how often have you been bothered by emotional problems such as feeling anxious, depressed, or irritable? Rarely   In the past 7 days, how would you rate your fatigue on average? Mild "   In the past 7 days, how would you rate your pain on average, where 0 means no pain, and 10 means worst imaginable pain? 7   In general, would you say your health is: 2   In general, would you say your quality of life is: 2   In general, how would you rate your physical health? 2   In general, how would you rate your mental health, including your mood and your ability to think? 2   In general, how would you rate your satisfaction with your social activities and relationships? 2   In general, please rate how well you carry out your usual social activities and roles. (This includes activities at home, at work and in your community, and responsibilities as a parent, child, spouse, employee, friend, etc.) 2   To what extent are you able to carry out your everyday physical activities such as walking, climbing stairs, carrying groceries, or moving a chair? 2   In the past 7 days, how often have you been bothered by emotional problems such as feeling anxious, depressed, or irritable? 2   In the past 7 days, how would you rate your fatigue on average? 2   In the past 7 days, how would you rate your pain on average, where 0 means no pain, and 10 means worst imaginable pain? 7   Global Mental Health Score 10   Global Physical Health Score 10   PROMIS TOTAL - SUBSCORES 20                Shahram Ospina MA

## 2024-06-26 NOTE — LETTER
6/26/2024      Garth Montgomery  [address]   [place] [address]      Dear Colleague,    Thank you for referring your patient, Garth Montgomery, to the [facility] [place]. Please see a copy of my visit note below.    Rayne returns today for follow-up along with her daughter-in-law.  She has ongoing left groin/buttock pain.  She has been seen by couple of pain providers who have done piriformis block and a greater troches injection which really did not relieve her symptoms.  She does think that maybe we have improved her overall function a little bit after fixing her pelvis fracture.  Very importantly she has been on Evenity since November.    Of note in addition to her sacral insufficiency fracture that she initially presented with there was a question of a pedicle fracture and she does have a significant scoliosis with degenerative component on top of this.    Visual analog pain scale is a 5 for left hip pain primary location is the hip.  Oswestry score is 49%.    Objective: Physical exam reveals an alert pleasant female appearing her stated age.  Her stance shows some flat back posture and an obvious scoliotic deformity.  On palpation she is tender over her greater trochanter and a little bit her ischial tuberosity.  Seated internal rotation of her left hip gives her significant reproduction of her pain.    Imaging: AP lateral and Mcelroy views of the pelvis are obtained.  I have independently ordered and interpreted these imaging studies today.  The instrumentation is in place there is no evidence of screw loosening.  Of note she does have significant inferomedial osteophytic spurring and bilateral hips.          Assessment/Plan: Sacral insufficiency fracture stable post fixation and treatment of the osteoporosis with apparent good response.  Hip pain most likely hip osteoarthritic change.  Will refer her to [facility] either Dr. Masterson or Dr. Oliveira.  I called and spoke to   Love who originally referred her to me and asked who his partners aware that her doing hip replacements.  He put a message into the system to have them reach out to contact her to set up an appointment for evaluation for possible total hip arthroplasty.  Her scoliosis appears stable.  What I have recommended to her is that we see about getting her hip treated.  If this takes care of her problem then were done if not then we may need to readdress her scoliotic deformity down the road.  She thinks this plan makes sense and is in agreement with that and we will move forward with that.    My total contact time on the day of visit for this visit including image ordering, image interpretation, face-to-face evaluation, telephone conversation with a colleague to coordinate care and documentation was greater than 30 minutes.    Jones Hagen MD

## 2024-08-01 DIAGNOSIS — M84.454K INSUFFICIENCY FRACTURE OF PELVIS WITH NONUNION, SUBSEQUENT ENCOUNTER: ICD-10-CM

## 2024-08-01 RX ORDER — GABAPENTIN 100 MG/1
CAPSULE ORAL
Qty: 30 CAPSULE | Refills: 0 | Status: SHIPPED | OUTPATIENT
Start: 2024-08-01

## 2024-09-16 DIAGNOSIS — M84.454K INSUFFICIENCY FRACTURE OF PELVIS WITH NONUNION, SUBSEQUENT ENCOUNTER: ICD-10-CM

## 2024-12-15 RX ORDER — GABAPENTIN 100 MG/1
CAPSULE ORAL
Qty: 30 CAPSULE | Refills: 0 | OUTPATIENT
Start: 2024-12-15

## 2025-01-19 ENCOUNTER — HEALTH MAINTENANCE LETTER (OUTPATIENT)
Age: 80
End: 2025-01-19

## (undated) DEVICE — GLOVE BIOGEL PI SZ 8.0 40880

## (undated) DEVICE — SPONGE SURGIFOAM 100 1974

## (undated) DEVICE — POSITIONER ARMBOARD FOAM 1PAIR LF FP-ARMB1

## (undated) DEVICE — SUTURE VICRYL+ 2-0 CT-2 27" UND VCP269H

## (undated) DEVICE — LINEN TOWEL PACK X5 5464

## (undated) DEVICE — SU VICRYL 1 CT-1 36" UND J947H

## (undated) DEVICE — DRAPE POUCH IRR 1016

## (undated) DEVICE — LINEN BACK PACK 5440

## (undated) DEVICE — SU MONOCRYL 4-0 PS-2 18" UND Y496G

## (undated) DEVICE — PREP DURAPREP REMOVER 4OZ 8611

## (undated) DEVICE — SOL WATER IRRIG 1000ML BOTTLE 2F7114

## (undated) DEVICE — PIN PERCUTANEOUS NAVIGATION FOR SPINE O-ARM 100MM 9733235

## (undated) DEVICE — Device

## (undated) DEVICE — DRILL BIT QUICK COUPLING CAN 5.0X300MM 310.63

## (undated) DEVICE — STPL SKIN 35W ROTATING HEAD PRW35

## (undated) DEVICE — PACK UNIVERSAL SPLIT 29131

## (undated) DEVICE — DRSG AQUACEL EXTRA AG 4X5" 422299

## (undated) DEVICE — DRAPE O ARM TUBE 9732722

## (undated) DEVICE — DRSG PRIMAPORE 02X3" 7133

## (undated) DEVICE — GLOVE BIOGEL PI MICRO INDICATOR UNDERGLOVE SZ 8.0 48980

## (undated) DEVICE — MARKER SPHERES PASSIVE MEDT PACK 5 8801075

## (undated) DEVICE — SOL NACL 0.9% IRRIG 1000ML BOTTLE 2F7124

## (undated) DEVICE — SU DERMABOND ADVANCED .7ML DNX12

## (undated) DEVICE — SUCTION MANIFOLD NEPTUNE 2 SYS 4 PORT 0702-020-000

## (undated) RX ORDER — HYDROMORPHONE HYDROCHLORIDE 1 MG/ML
INJECTION, SOLUTION INTRAMUSCULAR; INTRAVENOUS; SUBCUTANEOUS
Status: DISPENSED
Start: 2024-01-02

## (undated) RX ORDER — KETOROLAC TROMETHAMINE 30 MG/ML
INJECTION, SOLUTION INTRAMUSCULAR; INTRAVENOUS
Status: DISPENSED
Start: 2024-01-02

## (undated) RX ORDER — FENTANYL CITRATE-0.9 % NACL/PF 10 MCG/ML
PLASTIC BAG, INJECTION (ML) INTRAVENOUS
Status: DISPENSED
Start: 2024-01-02

## (undated) RX ORDER — ONDANSETRON 2 MG/ML
INJECTION INTRAMUSCULAR; INTRAVENOUS
Status: DISPENSED
Start: 2024-01-02

## (undated) RX ORDER — DEXAMETHASONE SODIUM PHOSPHATE 4 MG/ML
INJECTION, SOLUTION INTRA-ARTICULAR; INTRALESIONAL; INTRAMUSCULAR; INTRAVENOUS; SOFT TISSUE
Status: DISPENSED
Start: 2024-01-02

## (undated) RX ORDER — GLYCOPYRROLATE 0.2 MG/ML
INJECTION INTRAMUSCULAR; INTRAVENOUS
Status: DISPENSED
Start: 2024-01-02

## (undated) RX ORDER — BUPIVACAINE HYDROCHLORIDE AND EPINEPHRINE 5; 5 MG/ML; UG/ML
INJECTION, SOLUTION EPIDURAL; INTRACAUDAL; PERINEURAL
Status: DISPENSED
Start: 2024-01-02

## (undated) RX ORDER — CEFAZOLIN SODIUM/WATER 2 G/20 ML
SYRINGE (ML) INTRAVENOUS
Status: DISPENSED
Start: 2024-01-02

## (undated) RX ORDER — FENTANYL CITRATE 50 UG/ML
INJECTION, SOLUTION INTRAMUSCULAR; INTRAVENOUS
Status: DISPENSED
Start: 2024-01-02

## (undated) RX ORDER — PROPOFOL 10 MG/ML
INJECTION, EMULSION INTRAVENOUS
Status: DISPENSED
Start: 2024-01-02

## (undated) RX ORDER — ACETAMINOPHEN 325 MG/1
TABLET ORAL
Status: DISPENSED
Start: 2024-01-02

## (undated) RX ORDER — OXYCODONE HYDROCHLORIDE 5 MG/1
TABLET ORAL
Status: DISPENSED
Start: 2024-01-02

## (undated) RX ORDER — GABAPENTIN 100 MG/1
CAPSULE ORAL
Status: DISPENSED
Start: 2024-01-02